# Patient Record
Sex: MALE | Race: WHITE | NOT HISPANIC OR LATINO | Employment: OTHER | URBAN - NONMETROPOLITAN AREA
[De-identification: names, ages, dates, MRNs, and addresses within clinical notes are randomized per-mention and may not be internally consistent; named-entity substitution may affect disease eponyms.]

---

## 2017-04-11 ENCOUNTER — OFFICE VISIT (OUTPATIENT)
Dept: CARDIOLOGY | Facility: CLINIC | Age: 82
End: 2017-04-11

## 2017-04-11 VITALS
SYSTOLIC BLOOD PRESSURE: 112 MMHG | HEART RATE: 64 BPM | WEIGHT: 155 LBS | HEIGHT: 69 IN | DIASTOLIC BLOOD PRESSURE: 48 MMHG | BODY MASS INDEX: 22.96 KG/M2

## 2017-04-11 DIAGNOSIS — R06.00 DYSPNEA, UNSPECIFIED TYPE: ICD-10-CM

## 2017-04-11 DIAGNOSIS — I49.5 SSS (SICK SINUS SYNDROME) (HCC): ICD-10-CM

## 2017-04-11 DIAGNOSIS — I10 ESSENTIAL HYPERTENSION: ICD-10-CM

## 2017-04-11 DIAGNOSIS — I34.0 NON-RHEUMATIC MITRAL REGURGITATION: Primary | ICD-10-CM

## 2017-04-11 DIAGNOSIS — N18.30 CHRONIC RENAL FAILURE, STAGE 3 (MODERATE) (HCC): ICD-10-CM

## 2017-04-11 DIAGNOSIS — I48.0 PAROXYSMAL A-FIB (HCC): ICD-10-CM

## 2017-04-11 PROCEDURE — 99213 OFFICE O/P EST LOW 20 MIN: CPT | Performed by: INTERNAL MEDICINE

## 2017-04-11 NOTE — PROGRESS NOTES
Jaime Velez  89 y.o. male    04/11/2017  1. Non-rheumatic mitral regurgitation    2. SSS (sick sinus syndrome)    3. Paroxysmal a-fib    4. Essential hypertension    5. Dyspnea, unspecified type    6. Chronic renal failure, stage 3 (moderate)        History of Present Illness:  routine follow-up    89 years old patient with history of sick sinus syndrome paroxysmal atrial fibrillation status post pacemaker implantation and hypertension presented for routine follow-up.  He seemed to be older than his stated age in very good mental status still physically very active.  The last echocardiographic study reported normal left and a systolic function with the mild-to-moderate tricuspid regurgitations.  Clinically, no significant progression in that.            SUBJECTIVE    No Known Allergies      Past Medical History:   Diagnosis Date   • Bradycardia    • Chronic renal failure, stage 3 (moderate)    • Depression    • Hypertension    • Hypothyroidism    • Paroxysmal atrial fibrillation    • Pneumonia          Past Surgical History:   Procedure Laterality Date   • APPENDECTOMY     • BACK SURGERY     • CATARACT EXTRACTION, BILATERAL     • INSERT / REPLACE / REMOVE PACEMAKER     • PACEMAKER IMPLANTATION     • PROSTATE SURGERY     • PROSTATE SURGERY           Family History   Problem Relation Age of Onset   • No Known Problems Mother    • No Known Problems Father          Social History     Social History   • Marital status:      Spouse name: N/A   • Number of children: N/A   • Years of education: N/A     Occupational History   • Not on file.     Social History Main Topics   • Smoking status: Former Smoker   • Smokeless tobacco: Never Used   • Alcohol use No   • Drug use: No   • Sexual activity: Defer     Other Topics Concern   • Not on file     Social History Narrative         Current Outpatient Prescriptions   Medication Sig Dispense Refill   • apixaban (ELIQUIS) 2.5 MG tablet tablet Take 2.5 mg by mouth 2 (Two)  "Times a Day.     • Calcium Carb-Cholecalciferol (OYSTER SHELL CALCIUM/VITAMIN D) 250-125 MG-UNIT tablet tablet Take  by mouth 2 (Two) Times a Day.     • diltiazem CD (CARDIZEM CD) 180 MG 24 hr capsule Take 180 mg by mouth Daily.     • metoprolol tartrate (LOPRESSOR) 25 MG tablet Take 12.5 mg by mouth 2 (Two) Times a Day.     • Multiple Vitamins-Minerals (MULTIVITAMIN PO) Take  by mouth.     • sertraline (ZOLOFT) 50 MG tablet Take 50 mg by mouth Daily.       No current facility-administered medications for this visit.          OBJECTIVE    /48  Pulse 64  Ht 69\" (175.3 cm)  Wt 155 lb (70.3 kg)  BMI 22.89 kg/m2        Review of Systems     Constitutional:  Denies recent weight loss, weight gain, fever or chills, no change in exercise tolerance     HENT:  Denies any hearing loss, epistaxis, hoarseness, or difficulty speaking.     Eyes: Wears eyeglasses or contact lenses     Respiratory:  Denies dyspnea with exertion,no cough, wheezing, or hemoptysis.     Cardiovascular: Negative for palpations, chest pain, orthopnea, PND, peripheral edema, syncope, or claudication.     Gastrointestinal:  Denies change in bowel habits, dyspepsia, ulcer disease, hematochezia, or melena.     Endocrine: Negative for cold intolerance, heat intolerance, polydipsia, polyphagia and polyuria. Denies any history of weight change, heat/cold intolerance, polydipsia, polyuria     Genitourinary: Negative.      Musculoskeletal: Denies any history of arthritic symptoms or back problems     Skin:  Denies any change in hair or nails, rashes, or skin lesions.     Allergic/Immunologic: Negative.  Negative for environmental allergies, food allergies and immunocompromised state.     Neurological:  Denies any history of recurrent headaches, strokes, TIA, or seizure disorder.     Hematological: Denies any food allergies, seasonal allergies, bleeding disorders, or lymphadenopathy.     Psychiatric/Behavioral: Denies any history of depression, substance " abuse, or change in cognitive function.         Physical Exam     Constitutional: Cooperative, alert and oriented, well-developed, well-nourished, in no acute distress.     HENT:   Head: Normocephalic, normal hair patterns, no masses or tenderness.  Ears, Nose, and Throat: No gross abnormalities. No pallor or cyanosis. Dentition good.   Eyes: EOMS intact, PERRL, conjunctivae and lids unremarkable. Fundoscopic exam and visual fields not performed.   Neck: No palpable masses or adenopathy, no thyromegaly, no JVD, carotid pulses are full and equal bilaterally and without  Bruits.     Cardiovascular: Regular rhythm, S1 and S2 normal, no S3 or S4. Apical impulse not displaced. No murmurs, gallops, or rubs detected.     Pulmonary/Chest: Chest: normal symmetry, no tenderness to palpation, normal respiratory excursion, no intercostal retraction, no use of accessory muscles.            Pulmonary: Normal breath sounds. No rales or ronchi.    Abdominal: Abdomen soft, bowel sounds normoactive, no masses, no hepatosplenomegaly, non-tender, no bruits.     Musculoskeletal: No deformities, clubbing, cyanosis, erythema, or edema observed. There are no spinal abnormalities noted. Normal muscle strength and tone. Pulses full and equal in all extremities, no bruits auscultated.     Neurological: No gross motor or sensory deficits noted, affect appropriate, oriented to time, person, place.     Skin: Warm and dry to the touch, no apparent skin lesions or masses noted.     Psychiatric: He has a normal mood and affect. His behavior is normal. Judgment and thought content normal.         Procedures      Lab Results   Component Value Date    WBC 8.8 03/05/2014    HGB 11.1 (L) 03/05/2014    HCT 32.8 (L) 03/05/2014    MCV 84.3 03/05/2014     03/05/2014     Lab Results   Component Value Date    GLUCOSE 131 (H) 03/07/2014    BUN 41 (H) 03/07/2014    CREATININE 1.5 (H) 03/07/2014    CO2 26 03/07/2014    CALCIUM 8.3 (L) 03/07/2014     ALBUMIN 3.0 (L) 03/01/2014    AST 19 03/01/2014    ALT 28 03/01/2014     No results found for: CHOL  No results found for: TRIG  No results found for: HDL  No results found for: LDLCALC  No results found for: LDL  No results found for: HDLLDLRATIO  No components found for: CHOLHDL  No results found for: HGBA1C  No results found for: TSH, N2KXZSE, P8APLRJ, THYROIDAB        ASSESSMENT AND PLAN  #1 paroxysmal atrial fibrillations #2 sick sinus syndrome status post pacemaker implantation #3 hypertension with hypertensive heart disease #4 mild-to-moderate tricuspid regurgitation      Clinically there is no sign of any acute cardiac decompensation based on the clinical history physical finding.  No evidence of active ischemia.  The patient is pleased with the clinical outcome.  He is doing remarkably well from cardiac point of view.  His younger than his stated age still physically very active and very good mental status.  The patient will continue apixaban 2.5 mg to decrease risk of cardiac embolic phenomena and Cardizem with history of paroxysmal atrial fibrillation along with metoprolol.  I will see him back in 6-8 month R depends on patient clinical condition.  He will continue followed up with the pacemaker clinic as per scheduled appointment    Diagnoses and all orders for this visit:    Non-rheumatic mitral regurgitation    SSS (sick sinus syndrome)    Paroxysmal a-fib    Essential hypertension    Dyspnea, unspecified type    Chronic renal failure, stage 3 (moderate)        Marcos Barba MD  4/11/2017  2:36 PM

## 2017-05-17 ENCOUNTER — CLINICAL SUPPORT (OUTPATIENT)
Dept: CARDIOLOGY | Facility: CLINIC | Age: 82
End: 2017-05-17

## 2017-05-17 DIAGNOSIS — I49.5 SSS (SICK SINUS SYNDROME) (HCC): Primary | ICD-10-CM

## 2017-05-17 PROCEDURE — 93288 INTERROG EVL PM/LDLS PM IP: CPT | Performed by: INTERNAL MEDICINE

## 2017-11-08 ENCOUNTER — CLINICAL SUPPORT (OUTPATIENT)
Dept: CARDIOLOGY | Facility: CLINIC | Age: 82
End: 2017-11-08

## 2017-11-08 DIAGNOSIS — I49.5 SSS (SICK SINUS SYNDROME) (HCC): Primary | ICD-10-CM

## 2017-11-08 DIAGNOSIS — I48.0 PAROXYSMAL A-FIB (HCC): ICD-10-CM

## 2017-11-08 PROCEDURE — 93288 INTERROG EVL PM/LDLS PM IP: CPT | Performed by: INTERNAL MEDICINE

## 2017-11-08 NOTE — PROGRESS NOTES
89 years old with history of sick sinus syndrome status post pacemaker implantation manufacture [employer].  Model #2210 and serial number [id number].  Implantation date 1/20/2011 and interrogation 11/8/2017.  Battery voltage is good for 4-5 year.  There is a 2% mode switching.  Pacing the atrium about 90% ventricle less than 10% of time.  Pacing programmed DDD at 60 and 120.  AV delay 250 and PV delay to 25.  Patient currently in atrial fibrillation with right atrial lead impedance 390.  Sensing R-wave greater than 10 with a lead impedance 480 and threshold 1 at 0.5.  Clinical impression normal function pacemaker recommend to follow up in 6 month

## 2017-11-21 ENCOUNTER — HOSPITAL ENCOUNTER (OUTPATIENT)
Dept: SPEECH THERAPY | Facility: HOSPITAL | Age: 82
Setting detail: THERAPIES SERIES
Discharge: HOME | End: 2017-11-21

## 2017-11-21 ENCOUNTER — TRANSCRIBE ORDERS (OUTPATIENT)
Dept: SPEECH THERAPY | Facility: HOSPITAL | Age: 82
End: 2017-11-21

## 2017-11-21 DIAGNOSIS — J69.0 ASPIRATION PNEUMONIA OF LEFT LUNG, UNSPECIFIED ASPIRATION PNEUMONIA TYPE, UNSPECIFIED PART OF LUNG (HCC): Primary | ICD-10-CM

## 2017-11-21 PROCEDURE — G8998 SWALLOW D/C STATUS: HCPCS | Performed by: SPEECH-LANGUAGE PATHOLOGIST

## 2017-11-21 PROCEDURE — G8997 SWALLOW GOAL STATUS: HCPCS | Performed by: SPEECH-LANGUAGE PATHOLOGIST

## 2017-11-21 PROCEDURE — G8996 SWALLOW CURRENT STATUS: HCPCS | Performed by: SPEECH-LANGUAGE PATHOLOGIST

## 2017-11-21 PROCEDURE — 92610 EVALUATE SWALLOWING FUNCTION: CPT | Performed by: SPEECH-LANGUAGE PATHOLOGIST

## 2017-11-21 NOTE — THERAPY DISCHARGE NOTE
Outpatient Speech Language Pathology   Adult Swallow Initial Eval/Discharge  [facility]     Patient Name: Jaime Velez  : 3/22/1926  MRN: [id number]  Today's Date: 2017      Patient presented to outpatient speech following referral from Dr. Jp Ackerman. Patient is being treated for pneumonia. Patient stated that he does cough following meals at times but does not have difficulties eating. Patient stated that his cough has cleared since taking antibiotics for pneumonia. Patient completed bedside swallow evaluation this date. Patient completed trials of hard solid, soft solid, and thin liquids. Trials were self-fed. Thin liquid via rim of cup. Patient presented with appropriate oral phase and indicated no s/s of pharyngeal phase swallowing difficulties. Patient completed half a sandwich and 4 oz of thin liquids with no s/s of aspiration. Patient was able to produce a volitional cough and swallow. Hyolaryngeal elevation was judged to be appropriate. Patient was educated on swallow evaluation and given a brochure regarding normal swallow function and s/s of aspiration. Patient and daughter were in agreement. Patient does not present with dysphagia this date. Swallowing appears to be WFL. SLP encouraged patient to call if any changes occur. Evaluation only, discharge this date.     Visit Date: 2017   Patient Active Problem List   Diagnosis   • Chronic renal failure, stage 3 (moderate)   • Pneumonia   • Dyspnea   • Essential hypertension   • Paroxysmal a-fib   • SSS (sick sinus syndrome)   • Non-rheumatic mitral regurgitation        Past Medical History:   Diagnosis Date   • Bradycardia    • Chronic renal failure, stage 3 (moderate)    • Depression    • Hypertension    • Hypothyroidism    • Paroxysmal atrial fibrillation    • Pneumonia         Past Surgical History:   Procedure Laterality Date   • APPENDECTOMY     • BACK SURGERY     • CATARACT EXTRACTION, BILATERAL     • INSERT / REPLACE / REMOVE  PACEMAKER     • PACEMAKER IMPLANTATION     • PROSTATE SURGERY     • PROSTATE SURGERY           Visit Dx:     ICD-10-CM ICD-9-CM   1. Other dysphagia R13.19 787.29                   Adult Dysphagia - 11/21/17 0906     Adult Dysphagia Background    Patient Description of Complaint Patient stated that he coughs following meals at times but cough has cleared since being treated for pneumonia   -EA    Date of Onset Oct. 2017  -EA    Symptoms Reported by Patient Coughing  -EA    Patient Report of Functional Impact Patient stated that his difficulties have not impacted his eating - pt eats a regular diet  -EA    Current Diet (Solids) Regular  -EA    Diet Level (Liquids) Thin Liquid  -EA    Oral Mech    Respiratory/Swallow Coordination Pattern WFL  -EA    Respiratory/Swallow Coordination Pattern Details [id number]  -EA    Position During Evaluation Upright  -EA    Anatomy/Physiology WNL Patient demonstrates anatomy that is WNL  -EA    Dentition Patient is partially edentulous;Patient has dentures;Dentures were worn for this evaluation  -EA    Oral Health Oral cavity is clean  -EA    Awareness/Control of Secretions Patient swallows saliva  -EA    Foods/Liquids Presented    Method of Administration Self-fed  -EA    Self-Fed    Consistency Thin;Soft Solid;Hard Solid  -EA    Amount Half of a sandwich; thin liquids via cup  -EA    Response No Signs/Symptoms  -EA    Strategies Attempted None  -EA    Oral Phase Impaired Physiology Observed    Oral Phase No impairments noted  -EA    Pharyngeal Symptoms None observed  -EA    Summary Comments of Clinical Exam Patient presents with swallowing WFL. Patient presents with no s/s of dysphagia.  -EA      User Key  (r) = Recorded By, (t) = Taken By, (c) = Cosigned By    Initials Name Provider Type    GREGORIO Huang MS CCC-SLP Speech and Language Pathologist                            OP SLP Education       11/21/17 1000    Education    Barriers to Learning No barriers  identified  -EA    Action Taken to Address Barriers Daughter was present for evaluation   -EA    Education Provided Described results of evaluation;Patient expressed understanding of evaluation;Family/caregivers expressed understanding of evaluation  -EA    Assessed Learning needs;Learning readiness  -EA    Learning Motivation Strong  -EA    Learning Method Explanation;Demonstration;Written materials  -EA    Teaching Response Verbalized understanding;Demonstrated understanding  -EA    Education Comments SLP reviewed the results of the evaluation and provided brochure with information regarding swallowing stages, safe swallow strategies, and s/s of aspiration. SLP educated patient on normal swallow and s/s to be aware regarding aspiration. Patient was in agreement and verbalized understanding.   -EA      User Key  (r) = Recorded By, (t) = Taken By, (c) = Cosigned By    Initials Name Effective Dates    GREGORIO Huang MS CCC-SLP 10/17/16 -                     OP SLP Assessment/Plan - 11/21/17 1000     SLP Assessment    Patient would benefit from skilled therapy intervention No  -EA    SLP Plan    Plan Comments Evaluation Only - D/C  -EA      User Key  (r) = Recorded By, (t) = Taken By, (c) = Cosigned By    Initials Name Provider Type    GREGORIO Huang MS CCC-SLP Speech and Language Pathologist                 Time Calculation:   SLP Start Time: 0906  SLP Stop Time: 0953  SLP Time Calculation (min): 47 min  Total Timed Code Minutes- SLP: 47 minute(s)    Therapy Charges for Today     Code Description Service Date Service Provider Modifiers Qty    09166847822 HC ST SWALLOWING CURRENT STATUS 11/21/2017 Luh Huang MS CCC-SLP GN, CI 1    03970327676 HC ST SWALLOWING PROJECTED 11/21/2017 Luh Huang MS CCC-SLP GN, CI 1    1934450 HC ST SWALLOWING DISCHARGE 11/21/2017 Luh Huang MS CCC-SLP GN, CI 1    [id number] HC ST EVAL ORAL PHARYNG SWALLOW 3 11/21/2017 Luh Huang MS  CCC-SLP GN 1          SLP G-Codes  Functional Limitations: Swallowing  Swallow Current Status (): At least 1 percent but less than 20 percent impaired, limited or restricted  Swallow Goal Status (): At least 1 percent but less than 20 percent impaired, limited or restricted  Swallow Discharge Status (): At least 1 percent but less than 20 percent impaired, limited or restricted    OP SLP Discharge Summary  Date of Discharge: 11/21/17  Reason for Discharge: other (comment)  Outcomes Achieved: Other  Discharge Destination: Other (comment)  Discharge Instructions: Evaluation Only - SLP provided brochure and education on normal swallow and s/s of aspiration       Luh Huang MS CCC-SLP  11/21/2017

## 2017-12-05 ENCOUNTER — OFFICE VISIT (OUTPATIENT)
Dept: CARDIOLOGY | Facility: CLINIC | Age: 82
End: 2017-12-05

## 2017-12-05 VITALS
BODY MASS INDEX: 23.25 KG/M2 | SYSTOLIC BLOOD PRESSURE: 124 MMHG | DIASTOLIC BLOOD PRESSURE: 66 MMHG | HEIGHT: 69 IN | HEART RATE: 64 BPM | WEIGHT: 157 LBS

## 2017-12-05 DIAGNOSIS — I49.5 SSS (SICK SINUS SYNDROME) (HCC): Primary | ICD-10-CM

## 2017-12-05 DIAGNOSIS — I10 ESSENTIAL HYPERTENSION: ICD-10-CM

## 2017-12-05 DIAGNOSIS — I48.0 PAROXYSMAL A-FIB (HCC): ICD-10-CM

## 2017-12-05 DIAGNOSIS — I34.0 NON-RHEUMATIC MITRAL REGURGITATION: ICD-10-CM

## 2017-12-05 PROCEDURE — 99213 OFFICE O/P EST LOW 20 MIN: CPT | Performed by: INTERNAL MEDICINE

## 2017-12-05 NOTE — PROGRESS NOTES
Jaime Velez  89 y.o. male    12/05/2017  1. SSS (sick sinus syndrome)    2. Non-rheumatic mitral regurgitation    3. Paroxysmal a-fib    4. Essential hypertension        History of Present Illness:  routine follow-up     89 years old patient with history of sick sinus syndrome paroxysmal atrial fibrillation status post pacemaker implantation and hypertension presented for routine follow-up.  He seemed to be younger than his stated age , very good mental status still physically very active and good sense of humor.  The last echocardiographic study reported normal left and a systolic function with the mild-to-moderate tricuspid regurgitations.      Model #2210 and serial number [id number].  Implantation date 1/20/2011 and interrogation 11/8/2017.  Battery voltage is good for 4-5 year.  There is a 2% mode switching.  Pacing the atrium about 90% ventricle less than 10% of time.  Pacing programmed DDD at 60 and 120.  AV delay 250 and PV delay to 25.  Patient currently in atrial fibrillation with right atrial lead impedance 390.  Sensing R-wave greater than 10 with a lead impedance 480 and threshold 1 at 0.5.  Clinical impression normal function pacemaker recommend to follow up in 6 month      SUBJECTIVE    No Known Allergies      Past Medical History:   Diagnosis Date   • Bradycardia    • Chronic renal failure, stage 3 (moderate)    • Depression    • Hypertension    • Hypothyroidism    • Paroxysmal atrial fibrillation    • Pneumonia          Past Surgical History:   Procedure Laterality Date   • APPENDECTOMY     • BACK SURGERY     • CATARACT EXTRACTION, BILATERAL     • INSERT / REPLACE / REMOVE PACEMAKER     • PACEMAKER IMPLANTATION     • PROSTATE SURGERY     • PROSTATE SURGERY           Family History   Problem Relation Age of Onset   • No Known Problems Mother    • No Known Problems Father          Social History     Social History   • Marital status:      Spouse name: N/A   • Number of children: N/A   •  "Years of education: N/A     Occupational History   • Not on file.     Social History Main Topics   • Smoking status: Former Smoker   • Smokeless tobacco: Never Used   • Alcohol use No   • Drug use: No   • Sexual activity: Defer     Other Topics Concern   • Not on file     Social History Narrative         Current Outpatient Prescriptions   Medication Sig Dispense Refill   • apixaban (ELIQUIS) 2.5 MG tablet tablet Take 2.5 mg by mouth 2 (Two) Times a Day.     • Calcium Carb-Cholecalciferol (OYSTER SHELL CALCIUM/VITAMIN D) 250-125 MG-UNIT tablet tablet Take  by mouth 2 (Two) Times a Day.     • diltiazem CD (CARDIZEM CD) 180 MG 24 hr capsule Take 180 mg by mouth Daily.     • metoprolol tartrate (LOPRESSOR) 25 MG tablet Take 12.5 mg by mouth 2 (Two) Times a Day.     • Multiple Vitamins-Minerals (MULTIVITAMIN PO) Take  by mouth.     • sertraline (ZOLOFT) 50 MG tablet Take 50 mg by mouth Daily.       No current facility-administered medications for this visit.          OBJECTIVE    /66  Pulse 64  Ht 175.3 cm (69\")  Wt 71.2 kg (157 lb)  BMI 23.18 kg/m2        Review of Systems     Constitutional:  Denies recent weight loss, weight gain, fever or chills, no change in exercise tolerance     HENT:  Denies any hearing loss, epistaxis, hoarseness, or difficulty speaking.     Eyes: Wears eyeglasses or contact lenses     Respiratory:  Denies dyspnea with exertion,no cough, wheezing, or hemoptysis.     Cardiovascular: Negative for palpations, chest pain, orthopnea, PND, peripheral edema, syncope, or claudication.     Gastrointestinal:  Denies change in bowel habits, dyspepsia, ulcer disease, hematochezia, or melena.     Endocrine: Negative for cold intolerance, heat intolerance, polydipsia, polyphagia and polyuria. Denies any history of weight change, heat/cold intolerance, polydipsia, polyuria     Genitourinary: Negative.      Musculoskeletal: Denies any history of arthritic symptoms or back problems     Skin:  Denies " any change in hair or nails, rashes, or skin lesions.     Allergic/Immunologic: Negative.  Negative for environmental allergies, food allergies and immunocompromised state.     Neurological:  Denies any history of recurrent headaches, strokes, TIA, or seizure disorder.     Hematological: Denies any food allergies, seasonal allergies, bleeding disorders, or lymphadenopathy.     Psychiatric/Behavioral: Denies any history of depression, substance abuse, or change in cognitive function.         Physical Exam     Constitutional: Cooperative, alert and oriented, well-developed, well-nourished, in no acute distress.     HENT:   Head: Normocephalic, normal hair patterns, no masses or tenderness.  Ears, Nose, and Throat: No gross abnormalities. No pallor or cyanosis. Dentition good.   Eyes: EOMS intact, PERRL, conjunctivae and lids unremarkable. Fundoscopic exam and visual fields not performed.   Neck: No palpable masses or adenopathy, no thyromegaly, no JVD, carotid pulses are full and equal bilaterally and without  Bruits.     Cardiovascular: Regular rhythm, S1 and S2 normal, no S3 or S4. Apical impulse not displaced. No murmurs, gallops, or rubs detected.     Pulmonary/Chest: Chest: normal symmetry, no tenderness to palpation, normal respiratory excursion, no intercostal retraction, no use of accessory muscles.            Pulmonary: Normal breath sounds. No rales or ronchi.    Abdominal: Abdomen soft, bowel sounds normoactive, no masses, no hepatosplenomegaly, non-tender, no bruits.     Musculoskeletal: No deformities, clubbing, cyanosis, erythema, or edema observed. There are no spinal abnormalities noted. Normal muscle strength and tone. Pulses full and equal in all extremities, no bruits auscultated.     Neurological: No gross motor or sensory deficits noted, affect appropriate, oriented to time, person, place.     Skin: Warm and dry to the touch, no apparent skin lesions or masses noted.     Psychiatric: He has a  normal mood and affect. His behavior is normal. Judgment and thought content normal.         Procedures      Lab Results   Component Value Date    WBC 8.8 03/05/2014    HGB 11.1 (L) 03/05/2014    HCT 32.8 (L) 03/05/2014    MCV 84.3 03/05/2014     03/05/2014     Lab Results   Component Value Date    GLUCOSE 131 (H) 03/07/2014    BUN 41 (H) 03/07/2014    CREATININE 1.5 (H) 03/07/2014    CO2 26 03/07/2014    CALCIUM 8.3 (L) 03/07/2014    ALBUMIN 3.0 (L) 03/01/2014    AST 19 03/01/2014    ALT 28 03/01/2014     No results found for: CHOL  No results found for: TRIG  No results found for: HDL  No results found for: LDLCALC  No results found for: LDL  No results found for: HDLLDLRATIO  No components found for: CHOLHDL  No results found for: HGBA1C  No results found for: TSH, E1MODYG, N1OPZOW, THYROIDAB        ASSESSMENT AND PLAN    #1 paroxysmal atrial fibrillations #2 sick sinus syndrome status post pacemaker implantation #3 hypertension with hypertensive heart disease #4 mild-to-moderate tricuspid regurgitation        Clinically there is no sign of any acute cardiac decompensation based on the clinical history physical finding.  No evidence of active ischemia.  The patient is pleased with the clinical outcome.  He is doing remarkably well from cardiac point of view.  His younger than his stated age still physically very active and very good mental status.  The patient will continue apixaban 2.5 mg to decrease risk of cardiac embolic phenomena and Cardizem with history of paroxysmal atrial fibrillation along with metoprolol.  I will see him back in 6 OR depends on patient clinical condition.   Jaime was seen today for follow-up.    Diagnoses and all orders for this visit:    SSS (sick sinus syndrome)    Non-rheumatic mitral regurgitation    Paroxysmal a-fib    Essential hypertension        Marcos Barba MD  12/5/2017  2:34 PM

## 2018-05-09 ENCOUNTER — CLINICAL SUPPORT (OUTPATIENT)
Dept: CARDIOLOGY | Facility: CLINIC | Age: 83
End: 2018-05-09

## 2018-05-09 DIAGNOSIS — I49.5 SSS (SICK SINUS SYNDROME) (HCC): Primary | ICD-10-CM

## 2018-05-09 DIAGNOSIS — Z95.0 PACEMAKER: ICD-10-CM

## 2018-05-09 DIAGNOSIS — I48.0 PAROXYSMAL A-FIB (HCC): ICD-10-CM

## 2018-05-09 PROCEDURE — 93288 INTERROG EVL PM/LDLS PM IP: CPT | Performed by: NURSE PRACTITIONER

## 2018-05-09 NOTE — PROGRESS NOTES
Pacemaker Evaluation Report    May 9, 2018    Primary Cardiologist: Dr. Barba  Implanting MD: Dr. Barba  :[employer] Model: [employer] RF 2210 Serial Number: [id number]  Implant date: 1/20/2011    Reason for evaluation:routine   Office   PPM  Cardiac device indication(s): sick sinus syndrome    Battery  EMETERIO: 4.2 years     2.86v   Last charge: 0    Interrogation Results  Atrial sensing: P wave: 0.5 mV  Atrial capture: 0 V @ 0 ms Atrial Fib  Atrial lead impedance: 440 ohms  Ventricular sensing: R wave: 8.0 mV  Ventricular capture: 0.87 V @ 0.5 ms  LV: 0 V @ 0 ms   Ventricular lead impedance: right  460 ohms; left  0 ohms    Parameters  Mode: DDDR  Base Rate: 60/120    Diagnostic Data  Atrial paced: 1.4 % Ventricular paced: 63 %  Mode switch: >99%  AT/AF [place]: 8.9%  AHR: 397  Longest 6:49 min  (ongoing)  VHR: 2  AFib with RVR    Changes made: No changes made    Conclusions: normal device function    Assessment:  Presence of PPM  SSS          This document has been electronically signed by LYNDA Ponce on May 9, 2018 3:54 PM

## 2018-11-13 NOTE — PROGRESS NOTES
Pacemaker Evaluation Report    November 14, 2018    Primary Cardiologist: Dr. Barba  Implanting MD: Dr. Barba  :Abbott Model: Accent DR RF 2210 Serial Number: [id number]  Implant date: 1/20/2011     Reason for evaluation:routine   Office   PPM  Cardiac device indication(s): sick sinus syndrome    Battery  EMETERIO: 3.4 years     Interrogation Results  Atrial sensing: P wave: 0.5 mV  Atrial capture: not performed  Atrial lead impedance: 460 ohms  Ventricular sensing: R wave: 10.6 mV  Ventricular capture: 0.5 V @ 0.5 ms  Ventricular lead impedance: right  530 ohms    Parameters  Mode: DDDR  Base Rate: 60/120    Diagnostic Data  Atrial paced: <1 %   Ventricular paced: 60 %  Mode switch: 184, >99%  AT/AF [place]: >99%  AHR: 183  VHR: 0    Changes made: Turned of RV Auto Cap for high output    Conclusions: normal device function and Follow up in 6 months    Assessment:  1. SSS (sick sinus syndrome) (CMS/HCC)    2. Pacemaker              This document has been electronically signed by LYNDA Ponce on November 14, 2018 2:25 PM

## 2018-11-14 ENCOUNTER — CLINICAL SUPPORT (OUTPATIENT)
Dept: CARDIOLOGY | Facility: CLINIC | Age: 83
End: 2018-11-14

## 2018-11-14 DIAGNOSIS — Z95.0 PACEMAKER: ICD-10-CM

## 2018-11-14 DIAGNOSIS — I49.5 SSS (SICK SINUS SYNDROME) (HCC): Primary | ICD-10-CM

## 2018-11-14 PROCEDURE — 93280 PM DEVICE PROGR EVAL DUAL: CPT | Performed by: NURSE PRACTITIONER

## 2018-12-04 ENCOUNTER — OFFICE VISIT (OUTPATIENT)
Dept: CARDIOLOGY | Facility: CLINIC | Age: 83
End: 2018-12-04

## 2018-12-04 VITALS
DIASTOLIC BLOOD PRESSURE: 64 MMHG | BODY MASS INDEX: 22.81 KG/M2 | HEIGHT: 69 IN | WEIGHT: 154 LBS | OXYGEN SATURATION: 98 % | SYSTOLIC BLOOD PRESSURE: 122 MMHG | HEART RATE: 66 BPM

## 2018-12-04 DIAGNOSIS — N18.30 CHRONIC RENAL FAILURE, STAGE 3 (MODERATE) (HCC): ICD-10-CM

## 2018-12-04 DIAGNOSIS — I49.5 SSS (SICK SINUS SYNDROME) (HCC): ICD-10-CM

## 2018-12-04 DIAGNOSIS — I34.0 NON-RHEUMATIC MITRAL REGURGITATION: ICD-10-CM

## 2018-12-04 DIAGNOSIS — I10 ESSENTIAL HYPERTENSION: Primary | ICD-10-CM

## 2018-12-04 DIAGNOSIS — I48.0 PAROXYSMAL A-FIB (HCC): ICD-10-CM

## 2018-12-04 PROCEDURE — 99213 OFFICE O/P EST LOW 20 MIN: CPT | Performed by: INTERNAL MEDICINE

## 2018-12-04 NOTE — PROGRESS NOTES
Jaime Velez  89 y.o. male    12/04/2018  1. Essential hypertension    2. Paroxysmal A-fib (CMS/HCC)    3. SSS (sick sinus syndrome) (CMS/HCC)    4. Non-rheumatic mitral regurgitation    5. Chronic renal failure, stage 3 (moderate) (CMS/HCC)        History of Present Illness:    89 years old patient with history of sick sinus syndrome paroxysmal atrial fibrillation status post pacemaker implantation and hypertension presented for routine follow-up.  He seemed to be younger than his stated age , very good mental status still physically very active and good sense of humor.  Previous echocardiographic study 2016 reported normal left  systolic function with the mild-to-moderate tricuspid regurgitations.          Battery 11/2018  EMETERIO: 3.4 years      Interrogation Results  Atrial sensing: P wave: 0.5 mV  Atrial capture: not performed  Atrial lead impedance: 460 ohms  Ventricular sensing: R wave: 10.6 mV  Ventricular capture: 0.5 V @ 0.5 ms  Ventricular lead impedance: right  530 ohms     Parameters  Mode: DDDR  Base Rate: 60/120     Diagnostic Data  Atrial paced: <1 %   Ventricular paced: 60 %  Mode switch: 184, >99%  AT/AF [place]: >99%  AHR: 183  VHR: 0     Changes made: Turned of RV Auto Cap for high output     Conclusions: normal device function and Follow up in 6 months     Assessment:  1. SSS (sick sinus syndrome) (CMS/HCC)    2. Pacemaker             SUBJECTIVE:    No Known Allergies      Past Medical History:   Diagnosis Date   • Bradycardia    • Chronic renal failure, stage 3 (moderate) (CMS/HCC)    • Depression    • Hypertension    • Hypothyroidism    • Paroxysmal atrial fibrillation (CMS/HCC)    • Pneumonia          Past Surgical History:   Procedure Laterality Date   • APPENDECTOMY     • BACK SURGERY     • CATARACT EXTRACTION, BILATERAL     • INSERT / REPLACE / REMOVE PACEMAKER     • PACEMAKER IMPLANTATION     • PROSTATE SURGERY     • PROSTATE SURGERY           Family History   Problem Relation Age of Onset    • No Known Problems Mother    • No Known Problems Father          Social History     Socioeconomic History   • Marital status:      Spouse name: Not on file   • Number of children: Not on file   • Years of education: Not on file   • Highest education level: Not on file   Social Needs   • Financial resource strain: Not on file   • Food insecurity - worry: Not on file   • Food insecurity - inability: Not on file   • Transportation needs - medical: Not on file   • Transportation needs - non-medical: Not on file   Occupational History   • Not on file   Tobacco Use   • Smoking status: Former Smoker   • Smokeless tobacco: Never Used   Substance and Sexual Activity   • Alcohol use: No   • Drug use: No   • Sexual activity: Defer   Other Topics Concern   • Not on file   Social History Narrative   • Not on file         Current Outpatient Medications   Medication Sig Dispense Refill   • apixaban (ELIQUIS) 2.5 MG tablet tablet Take 2.5 mg by mouth 2 (Two) Times a Day.     • Calcium Carb-Cholecalciferol (OYSTER SHELL CALCIUM/VITAMIN D) 250-125 MG-UNIT tablet tablet Take  by mouth 2 (Two) Times a Day.     • diltiazem CD (CARDIZEM CD) 180 MG 24 hr capsule Take 180 mg by mouth Daily.     • metoprolol tartrate (LOPRESSOR) 25 MG tablet Take 12.5 mg by mouth 2 (Two) Times a Day.     • Multiple Vitamins-Minerals (MULTIVITAMIN PO) Take  by mouth.     • sertraline (ZOLOFT) 50 MG tablet Take 50 mg by mouth Daily.       No current facility-administered medications for this visit.            Review of Systems:     Constitutional:  Denies recent weight loss, weight gain, fever or chills, no change in exercise tolerance.     HENT:  Denies any hearing loss, epistaxis, hoarseness, or difficulty speaking.     Eyes: Wears eyeglasses or contact lenses.    Respiratory:  Denies dyspnea with exertion,no cough, wheezing, or hemoptysis.     Cardiovascular: Negative for palpations, chest pain, orthopnea, PND, peripheral edema, syncope, or  "claudication.     Gastrointestinal:  Denies change in bowel habits, dyspepsia, ulcer disease, hematochezia, or melena.     Endocrine: Negative for cold intolerance, heat intolerance, polydipsia, polyphagia and polyuria. Denies any history of weight change, polydipsia, polyuria.     Genitourinary: Negative.      Musculoskeletal: Denies any history of arthritic symptoms or back problems.     Skin:  Denies any change in hair or nails, rashes, or skin lesions.     Allergic/Immunologic: Negative.  Negative for environmental allergies, food allergies and immunocompromised state.     Neurological:  Denies any history of recurrent headaches, strokes, TIA, or seizure disorder.     Hematological: Denies any food allergies, seasonal allergies, bleeding disorders, or lymphadenopathy.     Psychiatric/Behavioral: Denies any history of depression, substance abuse, or change in cognitive function.       OBJECTIVE:    /64   Pulse 66   Ht 175.3 cm (69\")   Wt 69.9 kg (154 lb)   SpO2 98%   BMI 22.74 kg/m²       Physical Exam:     Constitutional: Cooperative, alert and oriented, well-developed, well-nourished, in no acute distress.     HENT:   Head: Normocephalic, normal hair patterns, no masses or tenderness.  Ears, Nose, and Throat: No gross abnormalities. No pallor or cyanosis. Dentition good.   Eyes: EOMS intact, PERRL, conjunctivae and lids unremarkable. Fundoscopic exam and visual fields not performed.   Neck: No palpable masses or adenopathy, no thyromegaly, no JVD, carotid pulses are full and equal bilaterally and without  Bruits.     Cardiovascular: Regular rhythm, S1 and S2 normal, no S3 or S4. Apical impulse not displaced. No murmurs, gallops, or rubs detected.     Pulmonary/Chest: Chest: normal symmetry, no tenderness to palpation, normal respiratory excursion, no intercostal retraction, no use of accessory muscles. Pulmonary: Normal breath sounds. No rales or rhonchi.    Abdominal: Abdomen soft, bowel sounds " normoactive, no masses, no hepatosplenomegaly, non-tender, no bruits.     Musculoskeletal: No deformities, clubbing, cyanosis, erythema, or edema observed. There are no spinal abnormalities noted. Normal muscle strength and tone. Pulses full and equal in all extremities, no bruits auscultated.     Neurological: No gross motor or sensory deficits noted, affect appropriate, oriented to time, person, place.     Skin: Warm and dry to the touch, no apparent skin lesions or masses noted.     Psychiatric: He has a normal mood and affect. His behavior is normal. Judgment and thought content normal.         Procedures      Lab Results   Component Value Date    WBC 8.8 03/05/2014    HGB 11.1 (L) 03/05/2014    HCT 32.8 (L) 03/05/2014    MCV 84.3 03/05/2014     03/05/2014     Lab Results   Component Value Date    GLUCOSE 131 (H) 03/07/2014    BUN 41 (H) 03/07/2014    CREATININE 1.5 (H) 03/07/2014    CO2 26 03/07/2014    CALCIUM 8.3 (L) 03/07/2014    ALBUMIN 3.0 (L) 03/01/2014    AST 19 03/01/2014    ALT 28 03/01/2014     No results found for: CHOL  No results found for: TRIG  No results found for: HDL  No components found for: LDLCALC  No results found for: LDL  No results found for: HDLLDLRATIO  No components found for: CHOLHDL  No results found for: HGBA1C  No results found for: TSH, D9EHSKC, R4DBTGS, THYROIDAB        ASSESSMENT AND PLAN:  #1 paroxysmal atrial fibrillations #2 sick sinus syndrome status post pacemaker implantation #3 hypertension with hypertensive heart disease #4 mild-to-moderate tricuspid regurgitation        Clinically there is no sign of any acute cardiac decompensation based on the clinical history physical finding.  No evidence of active ischemia.  The patient is pleased with the clinical outcome.  He is doing remarkably well from cardiac point of view.  His younger than his stated age still physically very active and very good mental status.  The patient will continue apixaban 2.5 mg to decrease  risk of cardiac embolic phenomena and Cardizem with history of paroxysmal atrial fibrillation along with metoprolol.  I will see him back in 6 OR depends on patient clinical condition.  we will arrange an echocardiographic study to reassess the tricuspid regurgitations prior to the next visit        Jaime was seen today for follow-up.    Diagnoses and all orders for this visit:    Essential hypertension    Paroxysmal A-fib (CMS/HCC)    SSS (sick sinus syndrome) (CMS/HCC)    Non-rheumatic mitral regurgitation    Chronic renal failure, stage 3 (moderate) (CMS/HCC)        Marcos Barba MD  12/4/2018  1:46 PM

## 2019-05-22 ENCOUNTER — CLINICAL SUPPORT (OUTPATIENT)
Dept: CARDIOLOGY | Facility: CLINIC | Age: 84
End: 2019-05-22

## 2019-05-22 DIAGNOSIS — Z95.0 PACEMAKER: ICD-10-CM

## 2019-05-22 DIAGNOSIS — I49.5 SSS (SICK SINUS SYNDROME) (HCC): Primary | ICD-10-CM

## 2019-05-22 DIAGNOSIS — I48.0 PAROXYSMAL A-FIB (HCC): ICD-10-CM

## 2019-05-22 DIAGNOSIS — Z79.01 CURRENT USE OF LONG TERM ANTICOAGULATION: ICD-10-CM

## 2019-05-22 PROCEDURE — 93288 INTERROG EVL PM/LDLS PM IP: CPT | Performed by: NURSE PRACTITIONER

## 2019-05-22 NOTE — PROGRESS NOTES
Pacemaker Evaluation Report    05/22/2019    Primary Cardiologist: Dr. Barba  Implanting MD: Dr. Barba  :[employer]ott Model: Accent DR RF 2210 Serial Number: [id number]  Implant date: 1/20/2011     Reason for evaluation:routine   Office   PPM  Cardiac device indication(s): sick sinus syndrome    Battery  EMETERIO: 2.2-2.6 years     Interrogation Results  Atrial sensing: P wave: 0.4 mV  Atrial capture: not performed  Atrial lead impedance: 430 ohms  Ventricular sensing: R wave: 8.7 mV  Ventricular capture: 0.75 V @ 0.5 ms  Ventricular lead impedance: right  550 ohms    Parameters  Mode: DDDR  Base Rate: 60/120    Diagnostic Data  Atrial paced: <1 %   Ventricular paced: 69 %  Mode switch:  >99%  AT/AF [place]: >99%  AHR: 56  VHR: 0    Changes made: N/A    Conclusions: normal device function and Follow up in 6 months    Assessment:  1. SSS (sick sinus syndrome) (CMS/HCC)    2. Paroxysmal A-fib (CMS/HCC)    3. Current use of long term anticoagulation    4. Pacemaker          This document has been electronically signed by LYNDA Kim on May 22, 2019 1:52 PM

## 2019-11-13 ENCOUNTER — CLINICAL SUPPORT (OUTPATIENT)
Dept: CARDIOLOGY | Facility: CLINIC | Age: 84
End: 2019-11-13

## 2019-11-13 DIAGNOSIS — I49.5 SSS (SICK SINUS SYNDROME) (HCC): Primary | ICD-10-CM

## 2019-11-13 DIAGNOSIS — Z95.0 PACEMAKER: ICD-10-CM

## 2019-11-13 PROCEDURE — 93288 INTERROG EVL PM/LDLS PM IP: CPT | Performed by: NURSE PRACTITIONER

## 2019-11-13 NOTE — PROGRESS NOTES
Pacemaker Evaluation Report    November 13, 2019    Primary Cardiologist: Dr. Barba  Implanting MD: Dr. Barba  :A[employer]ott Model: Accent DR RF 2210 Serial Number: [id number]  Implant date: 1/20/2011     Reason for evaluation:routine   Office   PPM  Cardiac device indication(s): sick sinus syndrome    Battery  EMETERIO: 1.7-2.1 years     Interrogation Results  Atrial sensing: P wave: 1.0 mV  Atrial capture: A fib  Atrial lead impedance: 430 ohms  Ventricular sensing: R wave: 11.5 mV  Ventricular capture: 0.5 V @ 0.5 ms  Ventricular lead impedance: right  580 ohms    Parameters  Mode: DDDR  Base Rate: 60/120    Diagnostic Data  Atrial paced: <1%   Ventricular paced: 63 %  Mode switch: 100%  AT/AF [place]: 100%  AHR: Chronic A fib  VHR: 1    Intrinsic Rate: 62    Changes made: none    Conclusions: normal device function and Follow up in 6 months    Assessment:  1. SSS (sick sinus syndrome) (CMS/HCC)    2. Pacemaker            This document has been electronically signed by LYNDA Ponce on November 13, 2019 5:20 PM

## 2019-12-03 ENCOUNTER — OFFICE VISIT (OUTPATIENT)
Dept: CARDIOLOGY | Facility: CLINIC | Age: 84
End: 2019-12-03

## 2019-12-03 VITALS
OXYGEN SATURATION: 99 % | WEIGHT: 139 LBS | DIASTOLIC BLOOD PRESSURE: 64 MMHG | BODY MASS INDEX: 20.59 KG/M2 | HEIGHT: 69 IN | HEART RATE: 68 BPM | SYSTOLIC BLOOD PRESSURE: 108 MMHG

## 2019-12-03 DIAGNOSIS — I49.5 SSS (SICK SINUS SYNDROME) (HCC): ICD-10-CM

## 2019-12-03 DIAGNOSIS — I48.0 PAROXYSMAL A-FIB (HCC): ICD-10-CM

## 2019-12-03 DIAGNOSIS — I34.0 NON-RHEUMATIC MITRAL REGURGITATION: ICD-10-CM

## 2019-12-03 DIAGNOSIS — I10 ESSENTIAL HYPERTENSION: Primary | ICD-10-CM

## 2019-12-03 PROCEDURE — 99213 OFFICE O/P EST LOW 20 MIN: CPT | Performed by: INTERNAL MEDICINE

## 2019-12-03 RX ORDER — PREDNISONE 2.5 MG
3 TABLET ORAL DAILY
COMMUNITY

## 2019-12-03 NOTE — PROGRESS NOTES
Jaime Velez  89 y.o. male    12/03/2019  1. Essential hypertension    2. Paroxysmal A-fib (CMS/HCC)    3. SSS (sick sinus syndrome) (CMS/HCC)    4. Non-rheumatic mitral regurgitation        History of Present Illness:    Patient's Body mass index is 20.53 kg/m². BMI is within normal parameters. No follow-up required..    89 years old patient with history of sick sinus syndrome paroxysmal atrial fibrillation status post pacemaker implantation and hypertension presented for routine follow-up.  He seemed to be younger than his stated age , very good mental status still physically very active and good sense of humor.  Previous echocardiographic study 2016 reported normal left  systolic function with the mild-to-moderate tricuspid regurgitations will arrange echocardiographic study to reassess the biventricular function and regurgitation status        9/2019  Glucose  70 - 110 mg/dL 105    BUN  7 - 18 mg/dL 42 Abnormally high     Creatinine  0.7 - 1.3 mg/dL 2 Abnormally high         11/3/2019  Battery  EMETERIO: 1.7-2.1 years            Interrogation Results  Atrial sensing: P wave: 1.0 mV  Atrial capture: A fib  Atrial lead impedance: 430 ohms  Ventricular sensing: R wave: 11.5 mV  Ventricular capture: 0.5 V @ 0.5 ms  Ventricular lead impedance: right  580 ohms     Parameters  Mode: DDDR  Base Rate: 60/120     Diagnostic Data  Atrial paced: <1%   Ventricular paced: 63 %  Mode switch: 100%  AT/AF [place]: 100%  AHR: Chronic A fib  VHR: 1     Intrinsic Rate: 62     Changes made: none     Conclusions: normal device function and Follow up in 6 months     Assessment:  1. SSS (sick sinus syndrome) (CMS/HCC)    2. Pacemaker          SUBJECTIVE:    No Known Allergies      Past Medical History:   Diagnosis Date   • Bradycardia    • Chronic renal failure, stage 3 (moderate) (CMS/HCC)    • Depression    • Hypertension    • Hypothyroidism    • Paroxysmal atrial fibrillation (CMS/HCC)    • Pneumonia          Past Surgical History:    Procedure Laterality Date   • APPENDECTOMY     • BACK SURGERY     • CATARACT EXTRACTION, BILATERAL     • INSERT / REPLACE / REMOVE PACEMAKER     • PACEMAKER IMPLANTATION     • PROSTATE SURGERY     • PROSTATE SURGERY           Family History   Problem Relation Age of Onset   • No Known Problems Mother    • No Known Problems Father          Social History     Socioeconomic History   • Marital status:      Spouse name: Not on file   • Number of children: Not on file   • Years of education: Not on file   • Highest education level: Not on file   Tobacco Use   • Smoking status: Former Smoker   • Smokeless tobacco: Never Used   Substance and Sexual Activity   • Alcohol use: No   • Drug use: No   • Sexual activity: Defer         Current Outpatient Medications   Medication Sig Dispense Refill   • apixaban (ELIQUIS) 2.5 MG tablet tablet Take 2.5 mg by mouth 2 (Two) Times a Day.     • Calcium Carb-Cholecalciferol (OYSTER SHELL CALCIUM/VITAMIN D) 250-125 MG-UNIT tablet tablet Take  by mouth 2 (Two) Times a Day.     • diltiazem CD (CARDIZEM CD) 180 MG 24 hr capsule Take 180 mg by mouth Daily.     • metoprolol tartrate (LOPRESSOR) 25 MG tablet Take 12.5 mg by mouth 2 (Two) Times a Day.     • Multiple Vitamins-Minerals (MULTIVITAMIN PO) Take  by mouth.     • predniSONE (DELTASONE) 2.5 MG tablet Take 2.5 mg by mouth Daily.     • sertraline (ZOLOFT) 50 MG tablet Take 50 mg by mouth Daily.       No current facility-administered medications for this visit.            Review of Systems:     Constitutional:  Denies recent weight loss, weight gain, fever or chills, no change in exercise tolerance.     HENT:  Denies any hearing loss, epistaxis, hoarseness, or difficulty speaking.     Eyes: Wears eyeglasses or contact lenses.    Respiratory:  Denies dyspnea with exertion,no cough, wheezing, or hemoptysis.     Cardiovascular: Negative for palpations, chest pain, orthopnea, PND, peripheral edema, syncope, or claudication.  "    Gastrointestinal:  Denies change in bowel habits, dyspepsia, ulcer disease, hematochezia, or melena.     Endocrine: Negative for cold intolerance, heat intolerance, polydipsia, polyphagia and polyuria. Denies any history of weight change, polydipsia, polyuria.     Genitourinary: Negative.      Musculoskeletal: Denies any history of arthritic symptoms or back problems.     Skin:  Denies any change in hair or nails, rashes, or skin lesions.     Allergic/Immunologic: Negative.  Negative for environmental allergies, food allergies and immunocompromised state.     Neurological:  Denies any history of recurrent headaches, strokes, TIA, or seizure disorder.     Hematological: Denies any food allergies, seasonal allergies, bleeding disorders, or lymphadenopathy.     Psychiatric/Behavioral: Denies any history of depression, substance abuse, or change in cognitive function.       OBJECTIVE:    /64   Pulse 68   Ht 175.3 cm (69\")   Wt 63 kg (139 lb)   SpO2 99%   BMI 20.53 kg/m²       Physical Exam:     Constitutional: Cooperative, alert and oriented, well-developed, well-nourished, in no acute distress.     HENT:   Head: Normocephalic, normal hair patterns, no masses or tenderness.  Ears, Nose, and Throat: No gross abnormalities. No pallor or cyanosis. Dentition good.   Eyes: EOMS intact, PERRL, conjunctivae and lids unremarkable. Fundoscopic exam and visual fields not performed.   Neck: No palpable masses or adenopathy, no thyromegaly, no JVD, carotid pulses are full and equal bilaterally and without  Bruits.     Cardiovascular: Regular rhythm, S1 and S2 normal, no S3 or S4. Apical impulse not displaced. No murmurs, gallops, or rubs detected.     Pulmonary/Chest: Chest: normal symmetry, no tenderness to palpation, normal respiratory excursion, no intercostal retraction, no use of accessory muscles. Pulmonary: Normal breath sounds. No rales or rhonchi.    Abdominal: Abdomen soft, bowel sounds normoactive, no " masses, no hepatosplenomegaly, non-tender, no bruits.     Musculoskeletal: No deformities, clubbing, cyanosis, erythema, or edema observed. There are no spinal abnormalities noted. Normal muscle strength and tone. Pulses full and equal in all extremities, no bruits auscultated.     Neurological: No gross motor or sensory deficits noted, affect appropriate, oriented to time, person, place.     Skin: Warm and dry to the touch, no apparent skin lesions or masses noted.     Psychiatric: He has a normal mood and affect. His behavior is normal. Judgment and thought content normal.         Procedures      Lab Results   Component Value Date    WBC 7.9 09/10/2019    HGB 12.6 (L) 09/10/2019    HCT 39.3 (L) 09/10/2019    MCV 93 09/10/2019     09/10/2019     Lab Results   Component Value Date    GLUCOSE 131 (H) 03/07/2014    BUN 42 (H) 09/10/2019    CREATININE 2 (H) 09/10/2019    CO2 26 03/07/2014    CALCIUM 9.5 09/10/2019    ALBUMIN 3.2 (L) 09/10/2019    AST 41 (H) 09/10/2019    ALT 30 09/10/2019     No results found for: CHOL  No results found for: TRIG  No results found for: HDL  No components found for: LDLCALC  No results found for: LDL  No results found for: HDLLDLRATIO  No components found for: CHOLHDL  No results found for: HGBA1C  Lab Results   Component Value Date    TSH 5.29 (H) 06/25/2018           ASSESSMENT AND PLAN:  #1 paroxysmal atrial fibrillations   #2 sick sinus syndrome status post pacemaker implantation   #3 hypertension with hypertensive heart disease   #4 mild-to-moderate tricuspid regurgitation        Clinically there is no sign of any acute cardiac decompensation based on the clinical history physical finding.  No evidence of active ischemia.  The patient is pleased with the clinical outcome.  He is doing remarkably well from cardiac point of view.  His younger than his stated age still physically very active and very good mental status.  The patient will continue apixaban 2.5 mg to decrease  risk of cardiac embolic phenomena and Cardizem with history of paroxysmal atrial fibrillation along with metoprolol.  I will see him back in 6 OR depends on patient clinical condition.  we will arrange an echocardiographic study to reassess the tricuspid regurgitations prior to the next visit    Jaime was seen today for follow-up.    Diagnoses and all orders for this visit:    Essential hypertension    Paroxysmal A-fib (CMS/HCC)    SSS (sick sinus syndrome) (CMS/HCC)    Non-rheumatic mitral regurgitation          Marcos Barba MD  12/3/2019  1:48 PM

## 2020-07-22 ENCOUNTER — CLINICAL SUPPORT (OUTPATIENT)
Dept: CARDIOLOGY | Facility: CLINIC | Age: 85
End: 2020-07-22

## 2020-07-22 DIAGNOSIS — I49.5 SSS (SICK SINUS SYNDROME) (HCC): Primary | ICD-10-CM

## 2020-07-22 DIAGNOSIS — Z95.0 PACEMAKER: ICD-10-CM

## 2020-07-22 PROCEDURE — 93288 INTERROG EVL PM/LDLS PM IP: CPT | Performed by: NURSE PRACTITIONER

## 2020-07-22 NOTE — PROGRESS NOTES
Pacemaker Evaluation Report    July 22, 2020    Primary Cardiologist: Dr. Barba  Implanting MD: Dr. Barba  :[employer] Model: [employer] DR RF 2210 Serial Number: [id number]  Implant date: 1/20/2011     Reason for evaluation: routine PPM Office  Cardiac device indication(s): sick sinus syndrome    Battery  EMETERIO: 5.3 months     Interrogation Results  Atrial sensing: P wave: 0.5 mV  Atrial capture: A fib  Atrial lead impedance: 430 ohms  Ventricular sensing: R wave: 10.4 mV  Ventricular capture: 0.5 V @ 0.5 ms  Ventricular lead impedance: right  560 ohms    Parameters  Mode: DDDR  Base Rate: 60/120    Diagnostic Data  Atrial paced: <1%   Ventricular paced: 68 %  Mode switch: 100%  AT/AF [place]: 100%  AHR: Chronic A fib  (On Eliquis)  VHR: 1    Office check in 2 months for EMETERIO    Intrinsic Rate: 64    Changes made: none    Conclusions: normal device function and Follow up in 6 months    Assessment:  1. SSS (sick sinus syndrome) (CMS/HCC)    2. Pacemaker                This document has been electronically signed by LYNDA Ellis on July 22, 2020 17:08

## 2020-09-23 ENCOUNTER — CLINICAL SUPPORT (OUTPATIENT)
Dept: CARDIOLOGY | Facility: CLINIC | Age: 85
End: 2020-09-23

## 2020-09-23 DIAGNOSIS — I49.5 SSS (SICK SINUS SYNDROME) (HCC): Primary | ICD-10-CM

## 2020-09-23 DIAGNOSIS — Z95.0 PACEMAKER: ICD-10-CM

## 2020-09-23 NOTE — PROGRESS NOTES
Pacemaker Evaluation Report    July 22, 2020    Primary Cardiologist: Dr. Barba  Implanting MD: Dr. Barba  :[employer] Model: [employer] DR RF 2210 Serial Number: [id number]  Implant date: 1/20/2011     Reason for evaluation: routine PPM Office battery check  Cardiac device indication(s): sick sinus syndrome    Battery  EMETERIO: > 3 months 2.62 V       Office check in 1 month for EMETERIO    Intrinsic Rate: 64    Changes made: none    Conclusions: normal device function and Follow up in 6 months    Assessment:  1. SSS (sick sinus syndrome) (CMS/HCC)    2. Pacemaker                This document has been electronically signed by LYNDA Ellis on September 25, 2020 08:49 CDT

## 2020-09-25 PROCEDURE — 93288 INTERROG EVL PM/LDLS PM IP: CPT | Performed by: NURSE PRACTITIONER

## 2020-10-28 ENCOUNTER — CLINICAL SUPPORT (OUTPATIENT)
Dept: CARDIOLOGY | Facility: CLINIC | Age: 85
End: 2020-10-28

## 2020-10-28 DIAGNOSIS — Z95.0 PACEMAKER: ICD-10-CM

## 2020-10-28 DIAGNOSIS — I49.5 SSS (SICK SINUS SYNDROME) (HCC): Primary | ICD-10-CM

## 2020-10-28 PROCEDURE — 93288 INTERROG EVL PM/LDLS PM IP: CPT | Performed by: NURSE PRACTITIONER

## 2020-10-28 NOTE — PROGRESS NOTES
Pacemaker Evaluation Report    October 28, 2020    Primary Cardiologist: Dr. Barba  Implanting MD: Dr. Barba  :[employer]ott Model: Accent DR RF 2210 Serial Number: [id number]  Implant date: 1/20/2011     Reason for evaluation: routine PPM Office battery check  Cardiac device indication(s): sick sinus syndrome    Battery  EMETERIO: > 3 months 2.60 V    Will discuss with Dr Barba about scheduling generator change.    Intrinsic Rate: 64    Changes made: none    Conclusions: normal device function and Follow up in 6 months    Assessment:  1. SSS (sick sinus syndrome) (CMS/HCC)    2. Pacemaker              This document has been electronically signed by LYNDA Ponce on October 28, 2020 16:08 CDT

## 2020-11-02 ENCOUNTER — TELEPHONE (OUTPATIENT)
Dept: CARDIOLOGY | Facility: CLINIC | Age: 85
End: 2020-11-02

## 2020-11-02 DIAGNOSIS — Z79.01 CURRENT USE OF LONG TERM ANTICOAGULATION: Primary | ICD-10-CM

## 2020-11-02 DIAGNOSIS — Z45.018 PACEMAKER END OF LIFE: ICD-10-CM

## 2020-11-02 NOTE — TELEPHONE ENCOUNTER
pts dtr Bhavani came in and was wondering if the gen change on his pacemaker has been scheduled yet?  Please call Bhavani at [phone] to schedule

## 2020-11-02 NOTE — TELEPHONE ENCOUNTER
PPM gen change scheduled for Thursday November 19th. Covid test will be Monday November 14th. Daughter Bhavani given all info and instructions. She verbalized understanding

## 2020-11-12 DIAGNOSIS — Z45.018 PACEMAKER END OF LIFE: Primary | ICD-10-CM

## 2020-11-12 DIAGNOSIS — Z79.01 CURRENT USE OF LONG TERM ANTICOAGULATION: ICD-10-CM

## 2020-11-12 DIAGNOSIS — I49.5 SSS (SICK SINUS SYNDROME) (HCC): ICD-10-CM

## 2020-11-12 RX ORDER — BUPIVACAINE HCL/0.9 % NACL/PF 0.1 %
2 PLASTIC BAG, INJECTION (ML) EPIDURAL ONCE
Status: CANCELLED | OUTPATIENT
Start: 2020-11-19 | End: 2020-11-12

## 2020-11-12 RX ORDER — SODIUM CHLORIDE 9 MG/ML
50 INJECTION, SOLUTION INTRAVENOUS CONTINUOUS
Status: CANCELLED | OUTPATIENT
Start: 2020-11-19

## 2020-11-16 ENCOUNTER — LAB (OUTPATIENT)
Dept: LAB | Facility: HOSPITAL | Age: 85
End: 2020-11-16

## 2020-11-16 DIAGNOSIS — Z01.818 PREOP TESTING: Primary | ICD-10-CM

## 2020-11-16 PROCEDURE — U0003 INFECTIOUS AGENT DETECTION BY NUCLEIC ACID (DNA OR RNA); SEVERE ACUTE RESPIRATORY SYNDROME CORONAVIRUS 2 (SARS-COV-2) (CORONAVIRUS DISEASE [COVID-19]), AMPLIFIED PROBE TECHNIQUE, MAKING USE OF HIGH THROUGHPUT TECHNOLOGIES AS DESCRIBED BY CMS-2020-01-R: HCPCS

## 2020-11-16 PROCEDURE — C9803 HOPD COVID-19 SPEC COLLECT: HCPCS

## 2020-11-17 LAB
COVID LABCORP PRIORITY: NORMAL
SARS-COV-2 RNA RESP QL NAA+PROBE: NOT DETECTED

## 2020-11-19 ENCOUNTER — ANESTHESIA EVENT (OUTPATIENT)
Dept: CARDIOLOGY | Facility: HOSPITAL | Age: 85
End: 2020-11-19

## 2020-11-19 ENCOUNTER — ANESTHESIA (OUTPATIENT)
Dept: CARDIOLOGY | Facility: HOSPITAL | Age: 85
End: 2020-11-19

## 2020-11-19 ENCOUNTER — HOSPITAL ENCOUNTER (OUTPATIENT)
Facility: HOSPITAL | Age: 85
Setting detail: HOSPITAL OUTPATIENT SURGERY
Discharge: HOME | End: 2020-11-19
Attending: INTERNAL MEDICINE | Admitting: INTERNAL MEDICINE

## 2020-11-19 VITALS
HEIGHT: 69 IN | TEMPERATURE: 98.8 F | RESPIRATION RATE: 18 BRPM | WEIGHT: 143.52 LBS | DIASTOLIC BLOOD PRESSURE: 53 MMHG | BODY MASS INDEX: 21.26 KG/M2 | HEART RATE: 63 BPM | SYSTOLIC BLOOD PRESSURE: 110 MMHG | OXYGEN SATURATION: 98 %

## 2020-11-19 DIAGNOSIS — I49.5 SSS (SICK SINUS SYNDROME) (HCC): ICD-10-CM

## 2020-11-19 DIAGNOSIS — Z79.01 CURRENT USE OF LONG TERM ANTICOAGULATION: ICD-10-CM

## 2020-11-19 DIAGNOSIS — Z45.018 PACEMAKER END OF LIFE: ICD-10-CM

## 2020-11-19 LAB
ANION GAP SERPL CALCULATED.3IONS-SCNC: 11 MMOL/L (ref 5–15)
BASOPHILS # BLD AUTO: 0.02 10*3/MM3 (ref 0–0.2)
BASOPHILS NFR BLD AUTO: 0.4 % (ref 0–1.5)
BUN SERPL-MCNC: 38 MG/DL (ref 8–23)
BUN/CREAT SERPL: 19.8 (ref 7–25)
CALCIUM SPEC-SCNC: 9.5 MG/DL (ref 8.2–9.6)
CHLORIDE SERPL-SCNC: 107 MMOL/L (ref 98–107)
CO2 SERPL-SCNC: 25 MMOL/L (ref 22–29)
CREAT SERPL-MCNC: 1.92 MG/DL (ref 0.76–1.27)
DEPRECATED RDW RBC AUTO: 48.5 FL (ref 37–54)
EOSINOPHIL # BLD AUTO: 0.07 10*3/MM3 (ref 0–0.4)
EOSINOPHIL NFR BLD AUTO: 1.3 % (ref 0.3–6.2)
ERYTHROCYTE [DISTWIDTH] IN BLOOD BY AUTOMATED COUNT: 13.7 % (ref 12.3–15.4)
GFR SERPL CREATININE-BSD FRML MDRD: 33 ML/MIN/1.73
GLUCOSE SERPL-MCNC: 95 MG/DL (ref 65–99)
HCT VFR BLD AUTO: 41.2 % (ref 37.5–51)
HGB BLD-MCNC: 13.3 G/DL (ref 13–17.7)
IMM GRANULOCYTES # BLD AUTO: 0.05 10*3/MM3 (ref 0–0.05)
IMM GRANULOCYTES NFR BLD AUTO: 0.9 % (ref 0–0.5)
INR PPP: 1 (ref 0.8–1.2)
LYMPHOCYTES # BLD AUTO: 0.78 10*3/MM3 (ref 0.7–3.1)
LYMPHOCYTES NFR BLD AUTO: 14 % (ref 19.6–45.3)
MCH RBC QN AUTO: 30.7 PG (ref 26.6–33)
MCHC RBC AUTO-ENTMCNC: 32.3 G/DL (ref 31.5–35.7)
MCV RBC AUTO: 95.2 FL (ref 79–97)
MONOCYTES # BLD AUTO: 0.4 10*3/MM3 (ref 0.1–0.9)
MONOCYTES NFR BLD AUTO: 7.2 % (ref 5–12)
NEUTROPHILS NFR BLD AUTO: 4.26 10*3/MM3 (ref 1.7–7)
NEUTROPHILS NFR BLD AUTO: 76.2 % (ref 42.7–76)
NRBC BLD AUTO-RTO: 0 /100 WBC (ref 0–0.2)
PLATELET # BLD AUTO: 148 10*3/MM3 (ref 140–450)
PMV BLD AUTO: 10.8 FL (ref 6–12)
POTASSIUM SERPL-SCNC: 4.6 MMOL/L (ref 3.5–5.2)
PROTHROMBIN TIME: 13.6 SECONDS (ref 11.1–15.3)
RBC # BLD AUTO: 4.33 10*6/MM3 (ref 4.14–5.8)
SODIUM SERPL-SCNC: 143 MMOL/L (ref 136–145)
WBC # BLD AUTO: 5.58 10*3/MM3 (ref 3.4–10.8)

## 2020-11-19 PROCEDURE — 33228 REMV&REPLC PM GEN DUAL LEAD: CPT | Performed by: INTERNAL MEDICINE

## 2020-11-19 PROCEDURE — 25010000002 FENTANYL CITRATE (PF) 100 MCG/2ML SOLUTION: Performed by: NURSE ANESTHETIST, CERTIFIED REGISTERED

## 2020-11-19 PROCEDURE — 85025 COMPLETE CBC W/AUTO DIFF WBC: CPT | Performed by: INTERNAL MEDICINE

## 2020-11-19 PROCEDURE — 80048 BASIC METABOLIC PNL TOTAL CA: CPT | Performed by: INTERNAL MEDICINE

## 2020-11-19 PROCEDURE — S0260 H&P FOR SURGERY: HCPCS | Performed by: INTERNAL MEDICINE

## 2020-11-19 PROCEDURE — C1785 PMKR, DUAL, RATE-RESP: HCPCS | Performed by: INTERNAL MEDICINE

## 2020-11-19 PROCEDURE — 85610 PROTHROMBIN TIME: CPT | Performed by: INTERNAL MEDICINE

## 2020-11-19 PROCEDURE — 25010000002 PROPOFOL 10 MG/ML EMULSION: Performed by: NURSE ANESTHETIST, CERTIFIED REGISTERED

## 2020-11-19 DEVICE — GEN PM ASSURITY MRI DR RF PM2272: Type: IMPLANTABLE DEVICE | Status: FUNCTIONAL

## 2020-11-19 RX ORDER — LIDOCAINE HYDROCHLORIDE 20 MG/ML
INJECTION, SOLUTION INFILTRATION; PERINEURAL AS NEEDED
Status: DISCONTINUED | OUTPATIENT
Start: 2020-11-19 | End: 2020-11-19 | Stop reason: HOSPADM

## 2020-11-19 RX ORDER — BUPIVACAINE HCL/0.9 % NACL/PF 0.1 %
2 PLASTIC BAG, INJECTION (ML) EPIDURAL ONCE
Status: COMPLETED | OUTPATIENT
Start: 2020-11-19 | End: 2020-11-19

## 2020-11-19 RX ORDER — SODIUM CHLORIDE 9 MG/ML
50 INJECTION, SOLUTION INTRAVENOUS CONTINUOUS
Status: DISCONTINUED | OUTPATIENT
Start: 2020-11-19 | End: 2020-11-19 | Stop reason: HOSPADM

## 2020-11-19 RX ORDER — FENTANYL CITRATE 50 UG/ML
INJECTION, SOLUTION INTRAMUSCULAR; INTRAVENOUS AS NEEDED
Status: DISCONTINUED | OUTPATIENT
Start: 2020-11-19 | End: 2020-11-19 | Stop reason: SURG

## 2020-11-19 RX ADMIN — PROPOFOL 25 MCG/KG/MIN: 10 INJECTION, EMULSION INTRAVENOUS at 09:40

## 2020-11-19 RX ADMIN — Medication 2 G: at 09:39

## 2020-11-19 RX ADMIN — SODIUM CHLORIDE 50 ML/HR: 9 INJECTION, SOLUTION INTRAVENOUS at 08:02

## 2020-11-19 RX ADMIN — FENTANYL CITRATE 25 MCG: 50 INJECTION, SOLUTION INTRAMUSCULAR; INTRAVENOUS at 09:37

## 2020-11-19 NOTE — ANESTHESIA POSTPROCEDURE EVALUATION
Patient: Jaime Velez    Procedure Summary     Date: 11/19/20 Room / Location: [facility] CATH/EP LAB 3 / [facility], [place] CATH INVASIVE LOCATION    Anesthesia Start: 0934 Anesthesia Stop: 1034    Procedure: PPM generator change - dual (N/A ) Diagnosis:       Current use of long term anticoagulation      Pacemaker end of life    Provider: Marcos Barba MD Provider: Erick Nelson MD    Anesthesia Type: MAC ASA Status: 3          Anesthesia Type: MAC    Vitals  No vitals data found for the desired time range.          Post Anesthesia Care and Evaluation    Patient location during evaluation: PHASE II  Patient participation: complete - patient participated  Level of consciousness: sleepy but conscious  Pain management: adequate  Airway patency: patent  Anesthetic complications: No anesthetic complications  PONV Status: none  Cardiovascular status: acceptable  Respiratory status: acceptable  Hydration status: acceptable    Comments: ---------------------------               11/19/20                      0755         ---------------------------   BP:          114/68        Pulse:         67           Resp:          18           Temp:   97.7 °F (36.5 °C)   SpO2:         100%         ---------------------------

## 2020-11-19 NOTE — H&P
Cardiology at [facility] History and Physical Note      Jaime Velez  Pool/NONE  [id number]  3/22/1926    DATE OF ADMISSION: 11/19/2020    Reason for Hospitalization: Pacemaker reached to EMETERIO status    History of Present Illness:    89 years old patient recently pacemaker interrogated and noted reached EMETERIO status and schedule to undergo generator replacement.  Patient denies symptoms testing of cardiac decompensation with history of sick sinus syndrome paroxysmal atrial fibrillation status post pacemaker implantation and hypertension presented for routine follow-up.  He seemed to be younger than his stated age , very good mental status still physically very active and good sense of humor.    No Known Allergies    Prior to Admission medications    Medication Sig Start Date End Date Taking? Authorizing Provider   Calcium Carb-Cholecalciferol (OYSTER SHELL CALCIUM/VITAMIN D) 250-125 MG-UNIT tablet tablet Take  by mouth 2 (Two) Times a Day.   Yes    diltiazem CD (CARDIZEM CD) 180 MG 24 hr capsule Take 180 mg by mouth Daily.   Yes Nancy Macias MD   metoprolol tartrate (LOPRESSOR) 25 MG tablet Take 12.5 mg by mouth 2 (Two) Times a Day.   Yes Nancy Macias MD   Multiple Vitamins-Minerals (MULTIVITAMIN PO) Take  by mouth.   Yes Nancy Macias MD   predniSONE (DELTASONE) 2.5 MG tablet Take 2.5 mg by mouth Daily.   Yes Nancy Macias MD   sertraline (ZOLOFT) 50 MG tablet Take 50 mg by mouth Daily.   Yes Nancy Macias MD   apixaban (ELIQUIS) 2.5 MG tablet tablet Take 2.5 mg by mouth 2 (Two) Times a Day.    Nancy Macias MD       Past Medical History:   Diagnosis Date   • Bradycardia    • Chronic renal failure, stage 3 (moderate)    • Depression    • Hypertension    • Hypothyroidism    • Paroxysmal atrial fibrillation (CMS/HCC)    • Pneumonia        Past Surgical History:   Procedure Laterality Date   • APPENDECTOMY     • BACK SURGERY     • CATARACT EXTRACTION,  "BILATERAL     • INSERT / REPLACE / REMOVE PACEMAKER     • PACEMAKER IMPLANTATION     • PROSTATE SURGERY     • PROSTATE SURGERY         Social History     Socioeconomic History   • Marital status:      Spouse name: Not on file   • Number of children: Not on file   • Years of education: Not on file   • Highest education level: Not on file   Tobacco Use   • Smoking status: Former Smoker   • Smokeless tobacco: Never Used   Substance and Sexual Activity   • Alcohol use: No   • Drug use: No   • Sexual activity: Defer       Family History   Problem Relation Age of Onset   • No Known Problems Mother    • No Known Problems Father        Patient Active Problem List   Diagnosis   • Chronic renal failure, stage 3 (moderate)   • Pneumonia   • Dyspnea   • Essential hypertension   • Paroxysmal A-fib (CMS/HCC)   • SSS (sick sinus syndrome) (CMS/HCC)   • Non-rheumatic mitral regurgitation   • Pacemaker   • Current use of long term anticoagulation   • Pacemaker end of life        REVIEW OF SYSTEMS:   Review of Systems    12 point ROS was performed and is negative except as outlined in HPI.       Objective:     Vitals:    11/18/20 1241 11/19/20 0755   BP:  168/75   Pulse:  67   Resp:  18   Temp:  97.7 °F (36.5 °C)   TempSrc:  Temporal   SpO2:  100%   Weight: 72.6 kg (160 lb) 65.1 kg (143 lb 8.3 oz)   Height:  175.3 cm (69\")     Body mass index is 21.19 kg/m².  Flowsheet Rows      First Filed Value   Admission Height  175.3 cm (69\") Documented at 11/19/2020 0755   Admission Weight  72.6 kg (160 lb) Documented at 11/18/2020 1241        No intake or output data in the 24 hours ending 11/19/20 0827      Physical Exam:  Constitutional: Oriented to person, place, and time.  Well-developed and well-nourished. No distress.   HENT: Normocephalic.   Eyes: Conjunctivae are normal. No scleral icterus.   Neck: Normal carotid pulses, no hepatojugular reflux and no JVD present. Carotid bruit is not present. No tracheal deviation, no edema and " no erythema present. No thyromegaly present.   Cardiovascular: Normal rate, regular rhythm, S1 normal, S2 normal, normal heart sounds and intact distal pulses.   No extrasystoles are present.   Pulmonary/Chest: Effort normal and breath sounds normal. No respiratory distress.   Abdominal: Soft. Bowel sounds are normal. Exhibits no distension and no mass.   Musculoskeletal:  Exhibits no edema, no tenderness.   Neurological: Alert and awake.  Skin: Skin warm and dry.  Psychiatric: Normal mood and affect.      Lab Review:                    Results from last 7 days   Lab Units 11/19/20  0804   WBC 10*3/mm3 5.58   HEMOGLOBIN g/dL 13.3   HEMATOCRIT % 41.2   PLATELETS 10*3/mm3 148                         I personally viewed and interpreted the patient's EKG/Telemetry data.      Assessment/Plan:       #1 paroxysmal atrial fibrillations   #2 sick sinus syndrome status post pacemaker implantation  reached to EMETERIO status  #3 hypertension with hypertensive heart disease    Clinically there is no sign of any acute cardiac decompensation based on the clinical history physical finding.  No evidence of active ischemia.  The patient is pleased with the clinical outcome.  He is doing remarkably well from cardiac point of view.  His younger than his stated age still physically very active and very good mental status.  Procedure risk regarding the generator replacement discussed with the patient.  Risk included but not limited to infection, bleeding, stroke, pneumothorax or hematoma.  Understand willing to proceed forward.  He is a Mallampati score 2 and ASA class II.  Will resume home medication after the procedure    Thank you for allowing me to participate in the care of Jaime Velez. Feel free to contact me directly with any further questions or concerns.    Marcos Barba MD  11/19/20  08:27 CST.    Part of this note may be an electronic transcription/translation of spoken language to printed text using the Dragon Dictation  system.

## 2020-11-19 NOTE — ANESTHESIA PREPROCEDURE EVALUATION
Anesthesia Evaluation     no history of anesthetic complications:  NPO Solid Status: > 8 hours  NPO Liquid Status: > 2 hours           Airway   Mallampati: II  TM distance: >3 FB  Neck ROM: full  Possible difficult intubation and Small opening  Dental    (+) poor dentition, partials and upper dentures        Pulmonary     breath sounds clear to auscultation  (+) shortness of breath,   (-) COPD, asthma, sleep apnea, not a smoker    ROS comment: Sinus drainage  Cardiovascular   Exercise tolerance: poor (<4 METS)    PT is on anticoagulation therapy  Patient on routine beta blocker and Beta blocker given within 24 hours of surgery  Rhythm: regular  Rate: normal    (+) pacemaker pacemaker, hypertension poorly controlled 2 medications or greater, valvular problems/murmurs MR, dysrhythmias Atrial Fib,   (-) past MI, angina, cardiac stents, DVT, hyperlipidemia      Neuro/Psych  (+) psychiatric history Depression,     (-) seizures, TIA, CVA, headaches, numbness  GI/Hepatic/Renal/Endo    (+)   renal disease CRI,   (-) GERD, hepatitis, liver disease, diabetes, no thyroid disorder    Musculoskeletal     (+) arthralgias,   Abdominal    Substance History   (-) alcohol use, drug use     OB/GYN          Other   arthritis, chronic steroid use      (-) history of cancer  ROS/Med Hx Other: Stopped eliquid 2 days ago  Hard of hearing    AAOx3, slow mentation, decreased short term memory                  Anesthesia Plan    ASA 3     MAC   total IV anesthesia  intravenous induction     Anesthetic plan, all risks, benefits, and alternatives have been provided, discussed and informed consent has been obtained with: patient.

## 2020-11-19 NOTE — DISCHARGE INSTRUCTIONS
Hold Eliquis for 2 days    Keep it dry for 1 week    Do not lift left arm above the shoulder    Wound checkup with the pacer clinic on Friday and then in 1 week

## 2020-12-16 ENCOUNTER — OFFICE VISIT (OUTPATIENT)
Dept: CARDIOLOGY | Facility: CLINIC | Age: 85
End: 2020-12-16

## 2020-12-16 VITALS
SYSTOLIC BLOOD PRESSURE: 128 MMHG | BODY MASS INDEX: 21.18 KG/M2 | DIASTOLIC BLOOD PRESSURE: 70 MMHG | OXYGEN SATURATION: 99 % | HEIGHT: 69 IN | HEART RATE: 69 BPM | WEIGHT: 143 LBS

## 2020-12-16 DIAGNOSIS — I49.5 SSS (SICK SINUS SYNDROME) (HCC): ICD-10-CM

## 2020-12-16 DIAGNOSIS — I10 ESSENTIAL HYPERTENSION: Primary | ICD-10-CM

## 2020-12-16 DIAGNOSIS — I34.0 NON-RHEUMATIC MITRAL REGURGITATION: ICD-10-CM

## 2020-12-16 DIAGNOSIS — I48.0 PAROXYSMAL A-FIB (HCC): ICD-10-CM

## 2020-12-16 PROCEDURE — 99024 POSTOP FOLLOW-UP VISIT: CPT | Performed by: INTERNAL MEDICINE

## 2020-12-16 RX ORDER — DILTIAZEM HYDROCHLORIDE 180 MG/1
180 CAPSULE, COATED, EXTENDED RELEASE ORAL DAILY
Qty: 90 CAPSULE | Refills: 3 | Status: SHIPPED | OUTPATIENT
Start: 2020-12-16 | End: 2022-02-20 | Stop reason: HOSPADM

## 2020-12-16 NOTE — PROGRESS NOTES
Jaime Velez  89 y.o. male    12/16/2020     1. Essential hypertension    2. Paroxysmal A-fib (CMS/HCC)    3. SSS (sick sinus syndrome) (CMS/HCC)    4. Non-rheumatic mitral regurgitation        History of Present Illness:    Patient's Body mass index is 21.11 kg/m². BMI is within normal parameters. No follow-up required..    89 years old patient presented for routine routine follow-up physically active good mental status and doing well from cardiac point of view with history of sick sinus syndrome paroxysmal atrial fibrillation status post pacemaker implantation and hypertension presented for routine follow-up.  He seemed to be younger than his stated age , very good mental status still physically very active and good sense of humor.  Previous echocardiographic study 2016 reported normal left  systolic function with the mild-to-moderate tricuspid regurgitations will arrange echocardiographic study to reassess the biventricular function and regurgitation status        9/2019  Glucose  70 - 110 mg/dL 105    BUN  7 - 18 mg/dL 42 Abnormally high     Creatinine  0.7 - 1.3 mg/dL 2 Abnormally high       October 28, 2020     Primary Cardiologist: Dr. Barba  Implanting MD: Dr. Barba  :[employer] Model: Algramo DR [id number] Serial Number: [id number]  Implant date: 1/20/2011     Reason for evaluation: routine PPM Office battery check  Cardiac device indication(s): sick sinus syndrome     Battery  EMETERIO: > 3 months 2.60 V     Will discuss with Dr Barba about scheduling generator change.     Intrinsic Rate: 64     Changes made: none     Conclusions: normal device function and Follow up in 6 months     Assessment:  1. SSS (sick sinus syndrome) (CMS/HCC)    2. Pacemaker        11/3/2019  Battery  EMETERIO: 1.7-2.1 years            Interrogation Results  Atrial sensing: P wave: 1.0 mV  Atrial capture: A fib  Atrial lead impedance: 430 ohms  Ventricular sensing: R wave: 11.5 mV  Ventricular capture: 0.5 V @ 0.5 ms  Ventricular lead  impedance: right  580 ohms     Parameters  Mode: DDDR  Base Rate: 60/120     Diagnostic Data  Atrial paced: <1%   Ventricular paced: 63 %  Mode switch: 100%  AT/AF [place]: 100%  AHR: Chronic A fib  VHR: 1     Intrinsic Rate: 62     Changes made: none     Conclusions: normal device function and Follow up in 6 months     Assessment:  1. SSS (sick sinus syndrome) (CMS/HCC)    2. Pacemaker          SUBJECTIVE:    No Known Allergies      Past Medical History:   Diagnosis Date   • Bradycardia    • Chronic renal failure, stage 3 (moderate)    • Depression    • Hypertension    • Hypothyroidism    • Paroxysmal atrial fibrillation (CMS/HCC)    • Pneumonia          Past Surgical History:   Procedure Laterality Date   • APPENDECTOMY     • BACK SURGERY     • CARDIAC ELECTROPHYSIOLOGY PROCEDURE N/A 11/19/2020    Procedure: PPM generator change - dual;  Surgeon: Marcos Barba MD;  Location: [facility] INVASIVE LOCATION;  Service: Cardiology;  Laterality: N/A;   • CATARACT EXTRACTION, BILATERAL     • INSERT / REPLACE / REMOVE PACEMAKER     • PACEMAKER IMPLANTATION     • PROSTATE SURGERY     • PROSTATE SURGERY           Family History   Problem Relation Age of Onset   • No Known Problems Mother    • No Known Problems Father          Social History     Socioeconomic History   • Marital status:      Spouse name: Not on file   • Number of children: Not on file   • Years of education: Not on file   • Highest education level: Not on file   Tobacco Use   • Smoking status: Former Smoker   • Smokeless tobacco: Never Used   Substance and Sexual Activity   • Alcohol use: No   • Drug use: No   • Sexual activity: Defer         Current Outpatient Medications   Medication Sig Dispense Refill   • apixaban (ELIQUIS) 2.5 MG tablet tablet Take 2.5 mg by mouth 2 (Two) Times a Day.     • Calcium Carb-Cholecalciferol (OYSTER SHELL CALCIUM/VITAMIN D) 250-125 MG-UNIT tablet tablet Take  by mouth 2 (Two) Times a Day.     • diltiazem CD (CARDIZEM  "CD) 180 MG 24 hr capsule Take 180 mg by mouth Daily.     • metoprolol tartrate (LOPRESSOR) 25 MG tablet Take 12.5 mg by mouth 2 (Two) Times a Day.     • Multiple Vitamins-Minerals (MULTIVITAMIN PO) Take  by mouth.     • predniSONE (DELTASONE) 2.5 MG tablet Take 2.5 mg by mouth Daily.     • sertraline (ZOLOFT) 50 MG tablet Take 50 mg by mouth Daily.       No current facility-administered medications for this visit.            Review of Systems:     Constitutional:  Denies recent weight loss, weight gain, fever or chills, no change in exercise tolerance.     HENT:  Denies any hearing loss, epistaxis, hoarseness, or difficulty speaking.     Eyes: No blurring    Respiratory:  Denies dyspnea with exertion,no cough, wheezing, or hemoptysis.     Cardiovascular: See H&P    Gastrointestinal:  Denies change in bowel habits, dyspepsia, ulcer disease, hematochezia, or melena.     Endocrine: Negative for cold intolerance, heat intolerance, polydipsia, polyphagia and polyuria. Denies any history of weight change, polydipsia, polyuria.     Genitourinary: Negative.      Musculoskeletal: Denies any history of arthritic symptoms or back problems.     Skin:  Denies any change in hair or nails, rashes, or skin lesions.     Allergic/Immunologic: Negative.  Negative for environmental allergies, food allergies and immunocompromised state.     Neurological:  Denies any history of recurrent headaches, strokes, TIA, or seizure disorder.     Hematological: Denies any food allergies, seasonal allergies, bleeding disorders, or lymphadenopathy.     Psychiatric/Behavioral: Denies any history of depression, substance abuse, or change in cognitive function.       OBJECTIVE:    /70 (BP Location: Left arm, Patient Position: Sitting, Cuff Size: Adult)   Pulse 69   Ht 175.3 cm (69.02\")   Wt 64.9 kg (143 lb)   SpO2 99%   BMI 21.11 kg/m²       Physical Exam:     Constitutional: Cooperative, alert and oriented, well-developed, well-nourished, " in no acute distress.     HENT:   Head: Normocephalic, normal hair patterns, no masses or tenderness.  Ears, Nose, and Throat: No gross abnormalities. No pallor or cyanosis. Dentition good.   Eyes: EOMS intact, PERRL, conjunctivae and lids unremarkable. Fundoscopic exam and visual fields not performed.   Neck: No palpable masses or adenopathy, no thyromegaly, no JVD, carotid pulses are full and equal bilaterally and without  Bruits.     Cardiovascular: Regular rhythm, S1 and S2 normal, no S3 or S4. Apical impulse not displaced. No murmurs, gallops, or rubs detected.     Pulmonary/Chest: Chest: normal symmetry, no tenderness to palpation, normal respiratory excursion, no intercostal retraction, no use of accessory muscles. Pulmonary: Normal breath sounds. No rales or rhonchi.    Abdominal: Abdomen soft, bowel sounds normoactive, no masses, no hepatosplenomegaly, non-tender, no bruits.     Musculoskeletal: No deformities, clubbing, cyanosis, erythema, or edema observed. There are no spinal abnormalities noted. Normal muscle strength and tone. Pulses full and equal in all extremities, no bruits auscultated.     Neurological: No gross motor or sensory deficits noted, affect appropriate, oriented to time, person, place.     Skin: Warm and dry to the touch, no apparent skin lesions or masses noted.     Psychiatric: He has a normal mood and affect. His behavior is normal. Judgment and thought content normal.         Procedures      Lab Results   Component Value Date    WBC 5.58 11/19/2020    HGB 13.3 11/19/2020    HCT 41.2 11/19/2020    MCV 95.2 11/19/2020     11/19/2020     Lab Results   Component Value Date    GLUCOSE 95 11/19/2020    BUN 38 (H) 11/19/2020    CREATININE 1.92 (H) 11/19/2020    EGFRIFNONA 33 (L) 11/19/2020    BCR 19.8 11/19/2020    CO2 25.0 11/19/2020    CALCIUM 9.5 11/19/2020    ALBUMIN 3.2 (L) 09/10/2019    AST 41 (H) 09/10/2019    ALT 30 09/10/2019     No results found for: CHOL  No results found  for: TRIG  No results found for: HDL  No components found for: LDLCALC  No results found for: LDL  No results found for: HDLLDLRATIO  No components found for: CHOLHDL  No results found for: HGBA1C  Lab Results   Component Value Date    TSH 5.29 (H) 06/25/2018           ASSESSMENT AND PLAN:  #1 paroxysmal atrial fibrillations   Doing well we will continue Eliquis to decrease risk of cardiac embolization, metoprolol and diltiazem as a part of AV gustabo blocking drug    #2 sick sinus syndrome status post pacemaker implantation     Continue follow-up with pacer clinic as per schedule appointment.  Pacemaker interrogated in October 2020 and battery voltage good for greater than 3 months    #3 hypertension with hypertensive heart disease    Good blood pressure and will continue metoprolol and Cardizem     #4 mild-to-moderate tricuspid regurgitation     arrange an echocardiogram to reassess the valvular status given last  echo in 2016         Diagnoses and all orders for this visit:    1. Essential hypertension (Primary)  -     ECG 12 Lead    2. Paroxysmal A-fib (CMS/HCC)  -     Adult Transthoracic Echo Complete W/ Cont if Necessary Per Protocol; Future    3. SSS (sick sinus syndrome) (CMS/HCC)  -     Adult Transthoracic Echo Complete W/ Cont if Necessary Per Protocol; Future    4. Non-rheumatic mitral regurgitation  -     Adult Transthoracic Echo Complete W/ Cont if Necessary Per Protocol; Future          Marcos Barba MD  12/16/2020  15:02 CST

## 2021-07-22 ENCOUNTER — TELEPHONE (OUTPATIENT)
Dept: CARDIOLOGY | Facility: CLINIC | Age: 86
End: 2021-07-22

## 2022-01-18 ENCOUNTER — APPOINTMENT (OUTPATIENT)
Dept: GENERAL RADIOLOGY | Facility: HOSPITAL | Age: 87
End: 2022-01-18

## 2022-01-18 ENCOUNTER — APPOINTMENT (OUTPATIENT)
Dept: CT IMAGING | Facility: HOSPITAL | Age: 87
End: 2022-01-18

## 2022-01-18 ENCOUNTER — HOSPITAL ENCOUNTER (EMERGENCY)
Facility: HOSPITAL | Age: 87
Discharge: HOME | End: 2022-01-18
Attending: FAMILY MEDICINE | Admitting: FAMILY MEDICINE

## 2022-01-18 VITALS
BODY MASS INDEX: 22.63 KG/M2 | OXYGEN SATURATION: 97 % | DIASTOLIC BLOOD PRESSURE: 71 MMHG | HEIGHT: 69 IN | TEMPERATURE: 98.1 F | SYSTOLIC BLOOD PRESSURE: 141 MMHG | RESPIRATION RATE: 18 BRPM | WEIGHT: 152.8 LBS | HEART RATE: 67 BPM

## 2022-01-18 DIAGNOSIS — R53.83 FATIGUE, UNSPECIFIED TYPE: ICD-10-CM

## 2022-01-18 DIAGNOSIS — U07.1 COVID-19: Primary | ICD-10-CM

## 2022-01-18 LAB
ALBUMIN SERPL-MCNC: 4 G/DL (ref 3.5–5.2)
ALBUMIN/GLOB SERPL: 1.9 G/DL
ALP SERPL-CCNC: 60 U/L (ref 39–117)
ALT SERPL W P-5'-P-CCNC: 35 U/L (ref 1–41)
ANION GAP SERPL CALCULATED.3IONS-SCNC: 11 MMOL/L (ref 5–15)
AST SERPL-CCNC: 42 U/L (ref 1–40)
BASOPHILS # BLD AUTO: 0.02 10*3/MM3 (ref 0–0.2)
BASOPHILS NFR BLD AUTO: 0.4 % (ref 0–1.5)
BILIRUB SERPL-MCNC: 0.2 MG/DL (ref 0–1.2)
BILIRUB UR QL STRIP: NEGATIVE
BUN SERPL-MCNC: 34 MG/DL (ref 8–23)
BUN/CREAT SERPL: 16.8 (ref 7–25)
CALCIUM SPEC-SCNC: 8.7 MG/DL (ref 8.2–9.6)
CHLORIDE SERPL-SCNC: 102 MMOL/L (ref 98–107)
CK SERPL-CCNC: 130 U/L (ref 20–200)
CLARITY UR: CLEAR
CO2 SERPL-SCNC: 22 MMOL/L (ref 22–29)
COLOR UR: YELLOW
CREAT SERPL-MCNC: 2.02 MG/DL (ref 0.76–1.27)
DEPRECATED RDW RBC AUTO: 47.2 FL (ref 37–54)
EOSINOPHIL # BLD AUTO: 0 10*3/MM3 (ref 0–0.4)
EOSINOPHIL NFR BLD AUTO: 0 % (ref 0.3–6.2)
ERYTHROCYTE [DISTWIDTH] IN BLOOD BY AUTOMATED COUNT: 13.9 % (ref 12.3–15.4)
FLUAV SUBTYP SPEC NAA+PROBE: NOT DETECTED
FLUBV RNA ISLT QL NAA+PROBE: NOT DETECTED
GFR SERPL CREATININE-BSD FRML MDRD: 31 ML/MIN/1.73
GLOBULIN UR ELPH-MCNC: 2.1 GM/DL
GLUCOSE SERPL-MCNC: 92 MG/DL (ref 65–99)
GLUCOSE UR STRIP-MCNC: NEGATIVE MG/DL
HCT VFR BLD AUTO: 36.2 % (ref 37.5–51)
HGB BLD-MCNC: 12.1 G/DL (ref 13–17.7)
HGB UR QL STRIP.AUTO: NEGATIVE
HOLD SPECIMEN: NORMAL
HOLD SPECIMEN: NORMAL
IMM GRANULOCYTES # BLD AUTO: 0.01 10*3/MM3 (ref 0–0.05)
IMM GRANULOCYTES NFR BLD AUTO: 0.2 % (ref 0–0.5)
KETONES UR QL STRIP: NEGATIVE
LEUKOCYTE ESTERASE UR QL STRIP.AUTO: NEGATIVE
LYMPHOCYTES # BLD AUTO: 0.49 10*3/MM3 (ref 0.7–3.1)
LYMPHOCYTES NFR BLD AUTO: 10.9 % (ref 19.6–45.3)
MAGNESIUM SERPL-MCNC: 2.1 MG/DL (ref 1.7–2.3)
MCH RBC QN AUTO: 30.8 PG (ref 26.6–33)
MCHC RBC AUTO-ENTMCNC: 33.4 G/DL (ref 31.5–35.7)
MCV RBC AUTO: 92.1 FL (ref 79–97)
MONOCYTES # BLD AUTO: 0.56 10*3/MM3 (ref 0.1–0.9)
MONOCYTES NFR BLD AUTO: 12.5 % (ref 5–12)
NEUTROPHILS NFR BLD AUTO: 3.41 10*3/MM3 (ref 1.7–7)
NEUTROPHILS NFR BLD AUTO: 76 % (ref 42.7–76)
NITRITE UR QL STRIP: NEGATIVE
NRBC BLD AUTO-RTO: 0 /100 WBC (ref 0–0.2)
PH UR STRIP.AUTO: <=5 [PH] (ref 5–9)
PLATELET # BLD AUTO: 119 10*3/MM3 (ref 140–450)
PMV BLD AUTO: 10.6 FL (ref 6–12)
POTASSIUM SERPL-SCNC: 4.9 MMOL/L (ref 3.5–5.2)
PROT SERPL-MCNC: 6.1 G/DL (ref 6–8.5)
PROT UR QL STRIP: ABNORMAL
RBC # BLD AUTO: 3.93 10*6/MM3 (ref 4.14–5.8)
RBC MORPH BLD: NORMAL
SARS-COV-2 RNA PNL SPEC NAA+PROBE: DETECTED
SMALL PLATELETS BLD QL SMEAR: NORMAL
SODIUM SERPL-SCNC: 135 MMOL/L (ref 136–145)
SP GR UR STRIP: 1.01 (ref 1–1.03)
UROBILINOGEN UR QL STRIP: ABNORMAL
WBC MORPH BLD: NORMAL
WBC NRBC COR # BLD: 4.49 10*3/MM3 (ref 3.4–10.8)

## 2022-01-18 PROCEDURE — 85007 BL SMEAR W/DIFF WBC COUNT: CPT | Performed by: FAMILY MEDICINE

## 2022-01-18 PROCEDURE — 83735 ASSAY OF MAGNESIUM: CPT | Performed by: FAMILY MEDICINE

## 2022-01-18 PROCEDURE — 81003 URINALYSIS AUTO W/O SCOPE: CPT | Performed by: FAMILY MEDICINE

## 2022-01-18 PROCEDURE — 85025 COMPLETE CBC W/AUTO DIFF WBC: CPT | Performed by: FAMILY MEDICINE

## 2022-01-18 PROCEDURE — 36415 COLL VENOUS BLD VENIPUNCTURE: CPT

## 2022-01-18 PROCEDURE — 99284 EMERGENCY DEPT VISIT MOD MDM: CPT

## 2022-01-18 PROCEDURE — 82550 ASSAY OF CK (CPK): CPT | Performed by: FAMILY MEDICINE

## 2022-01-18 PROCEDURE — 70450 CT HEAD/BRAIN W/O DYE: CPT

## 2022-01-18 PROCEDURE — 71045 X-RAY EXAM CHEST 1 VIEW: CPT

## 2022-01-18 PROCEDURE — 80053 COMPREHEN METABOLIC PANEL: CPT | Performed by: FAMILY MEDICINE

## 2022-01-18 PROCEDURE — 87636 SARSCOV2 & INF A&B AMP PRB: CPT | Performed by: FAMILY MEDICINE

## 2022-01-18 RX ORDER — DIAPER,BRIEF,INFANT-TODD,DISP
1 EACH MISCELLANEOUS ONCE
Status: COMPLETED | OUTPATIENT
Start: 2022-01-18 | End: 2022-01-18

## 2022-01-18 RX ORDER — ONDANSETRON 4 MG/1
4 TABLET, ORALLY DISINTEGRATING ORAL EVERY 6 HOURS PRN
Qty: 10 TABLET | Refills: 0 | Status: SHIPPED | OUTPATIENT
Start: 2022-01-18 | End: 2022-02-20 | Stop reason: HOSPADM

## 2022-01-18 RX ORDER — GUAIFENESIN, PSEUDOEPHEDRINE HYDROCHLORIDE 600; 60 MG/1; MG/1
1 TABLET, EXTENDED RELEASE ORAL EVERY 12 HOURS
Qty: 10 TABLET | Refills: 0 | Status: SHIPPED | OUTPATIENT
Start: 2022-01-18 | End: 2022-02-20 | Stop reason: HOSPADM

## 2022-01-18 RX ADMIN — BACITRACIN 1 APPLICATION: 500 OINTMENT TOPICAL at 23:01

## 2022-01-19 NOTE — ED NOTES
Had a note left for me to contact daughter for hlp in placing this patient for rehab. I spoke with Mrs Breaux on the phone.  She stated her father lives at [facility] and needs to go short term to rehab and then hopefully will be able to return to [facility].  She asked for referral be sent to [facility].  Stated her mother was there at one time and she is familiar with them.  I called and spoke with Jennifer.  I sent a referral to the in basket.  She will review and get back with us.  Daughter aware.

## 2022-01-19 NOTE — ED NOTES
Pt's daughter had exited room due to pt being covid +. Pt up, out of bed attempting to leave. Gait unsteady. Pulled off cardiac monitor/leads/BP cuff. Assisted back to bed. Discussed with clinical leader, Rosie, and Dr. Felix. Daughter to remain at bedside at this time for pt safety.     Kirsten Gallegos RN  01/18/22 2029

## 2022-01-19 NOTE — DISCHARGE PLACEMENT REQUEST
"Eugene Velez (89 y.o. Male)             Date of Birth Social Security Number Address Home Phone MRN    03/22/1926  [id number]  [facility] [address] [phone] [id number]    Confucianism Marital Status             Jain        Admission Date Admission Type Admitting Provider Attending Provider Department, Room/Bed    1/18/22 Emergency   [facility] EMERGENCY DEPARTMENT, 03/03    Discharge Date Discharge Disposition Discharge Destination          1/18/2022 Home or Self Care              Attending Provider: (none)   Allergies: No Known Allergies    Isolation: Enh Drop/Con   Infection: COVID (confirmed) (01/18/22)   Code Status: Not on file   Advance Care Planning Activity    Ht: 175.3 cm (69\")   Wt: 69.3 kg (152 lb 12.8 oz)    Admission Cmt: None   Principal Problem: None                Active Insurance as of 1/18/2022     Primary Coverage     Payor Plan Insurance Group Employer/Plan Group    MEDICARE MEDICARE A & B      Payor Plan Address Payor Plan Phone Number Payor Plan Fax Number Effective Dates    [address] [phone]  3/1/1991 - None Entered    [facility] [address]       Subscriber Name Subscriber Birth Date Member ID       EUGENE VELEZ 3/22/1926 [id number]           Secondary Coverage     Payor Plan Insurance Group Employer/Plan Group    HUMANA HUMANA [id number]     Payor Plan Address Payor Plan Phone Number Payor Plan Fax Number Effective Dates    [address] [address] [phone]  1/1/2013 - None Entered    [facility] [address]       Subscriber Name Subscriber Birth Date Member ID       EUGENE VELEZ 3/22/1926 [id number]           Tertiary Coverage     Payor Plan Insurance Group Employer/Plan Group    [employer] [id number]     Payor Plan Address Payor Plan Phone Number Payor Plan Fax Number Effective Dates    [facility] OFFICE OF [facility] CARE [phone]  1/1/2019 - None Entered    [address] [address]       [facility] [address]       Subscriber " Name Subscriber Birth Date Member ID       EUGENE DÍAZ 3/22/1926 [id number]                 Emergency Contacts      (Rel.) Home Phone Work Phone Mobile Phone    Bhavani Breaux (Daughter) [phone] -- [phone]            Emergency Contact Information     Name Relation Home Work Mobile    Bhavani Breaux Daughter [phone]             Emergency Department Notes      Paul Felix MD at 01/18/22 1920          Subjective   Patient had a ground-level fall yesterday at the [facility] living [place].  At that time he was seen at [facility] and discharged home after some negative x-rays.  His daughter presents to the emergency department with the patient and states that he has been having difficulty walking, diarrhea, confusion, and his fatigue has worsened.        Cough  Cough characteristics:  Unable to specify  Sputum characteristics:  Nondescript  Severity:  Mild  Timing:  Intermittent  Progression:  Waxing and waning  Chronicity:  New  Associated symptoms: shortness of breath    Associated symptoms: no chest pain, no chills, no diaphoresis, no ear pain, no eye discharge, no fever, no headaches, no myalgias, no rash, no rhinorrhea, no sore throat and no wheezing    Altered Mental Status  Presenting symptoms: no confusion    Associated symptoms: weakness    Associated symptoms: no abdominal pain, no fever, no headaches, no light-headedness, no nausea, no rash, no seizures and no vomiting    Weakness - Generalized  Associated symptoms: cough, diarrhea, dizziness and shortness of breath    Associated symptoms: no abdominal pain, no chest pain, no dysuria, no fever, no frequency, no headaches, no myalgias, no nausea, no seizures, no urgency and no vomiting        Review of Systems   Constitutional: Positive for activity change. Negative for appetite change, chills, diaphoresis, fatigue and fever.   HENT: Positive for congestion. Negative for ear discharge, ear pain,  nosebleeds, rhinorrhea, sinus pressure, sore throat and trouble swallowing.    Eyes: Negative for discharge and redness.   Respiratory: Positive for cough and shortness of breath. Negative for apnea, chest tightness and wheezing.    Cardiovascular: Negative for chest pain.   Gastrointestinal: Positive for diarrhea. Negative for abdominal pain, nausea and vomiting.   Endocrine: Negative for polyuria.   Genitourinary: Negative for dysuria, frequency and urgency.   Musculoskeletal: Negative for myalgias and neck pain.   Skin: Negative for color change and rash.   Allergic/Immunologic: Negative for immunocompromised state.   Neurological: Positive for dizziness and weakness. Negative for seizures, syncope, light-headedness and headaches.   Hematological: Negative for adenopathy. Does not bruise/bleed easily.   Psychiatric/Behavioral: Negative for behavioral problems and confusion.   All other systems reviewed and are negative.      Past Medical History:   Diagnosis Date   • Bradycardia    • Chronic renal failure, stage 3 (moderate) (HCC)    • Depression    • Hypertension    • Hypothyroidism    • Paroxysmal atrial fibrillation (HCC)    • Pneumonia        No Known Allergies    Past Surgical History:   Procedure Laterality Date   • APPENDECTOMY     • BACK SURGERY     • CARDIAC ELECTROPHYSIOLOGY PROCEDURE N/A 11/19/2020    Procedure: PPM generator change - dual;  Surgeon: Marcos Barba MD;  Location: [facility] INVASIVE LOCATION;  Service: Cardiology;  Laterality: N/A;   • CATARACT EXTRACTION, BILATERAL     • INSERT / REPLACE / REMOVE PACEMAKER     • PACEMAKER IMPLANTATION     • PROSTATE SURGERY     • PROSTATE SURGERY         Family History   Problem Relation Age of Onset   • No Known Problems Mother    • No Known Problems Father        Social History     Socioeconomic History   • Marital status:    Tobacco Use   • Smoking status: Former Smoker   • Smokeless tobacco: Never Used   Substance and Sexual Activity   •  Alcohol use: No   • Drug use: No   • Sexual activity: Defer           Objective   Physical Exam  Vitals and nursing note reviewed.   Constitutional:       Appearance: He is well-developed.   HENT:      Head: Normocephalic and atraumatic.      Nose: Nose normal.   Eyes:      General: No scleral icterus.        Right eye: No discharge.         Left eye: No discharge.      Conjunctiva/sclera: Conjunctivae normal.      Pupils: Pupils are equal, round, and reactive to light.   Neck:      Trachea: No tracheal deviation.   Cardiovascular:      Rate and Rhythm: Normal rate and regular rhythm.      Heart sounds: Normal heart sounds. No murmur heard.      Pulmonary:      Effort: Pulmonary effort is normal. No respiratory distress.      Breath sounds: Normal breath sounds. No stridor. No wheezing or rales.   Abdominal:      General: Bowel sounds are normal. There is no distension.      Palpations: Abdomen is soft. There is no mass.      Tenderness: There is no abdominal tenderness. There is no guarding or rebound.   Musculoskeletal:      Cervical back: Normal range of motion and neck supple.      Comments: Patient is able to perform leg raises without any difficulty.  There is a superficial skin tear roughly 2 x 3 cm over the lateral right elbow, that is dressed.  No significant erythema, warmth, or drainage.   Skin:     General: Skin is warm and dry.      Findings: No erythema or rash.   Neurological:      Mental Status: He is alert and oriented to person, place, and time.      Coordination: Coordination normal.   Psychiatric:         Behavior: Behavior normal.         Thought Content: Thought content normal.         Procedures          ED Course  ED Course as of 01/18/22 2247 Tue Jan 18, 2022 2235 Findings were discussed with the patient's daughter.  Patient does not meet any type of inpatient criteria, but he is a fall risk.  The patient's daughter, Bhavani Breaux, states that she can stay the night with the patient at  the [facility] living [facility].  She was advised to call the emergency department tomorrow to speak with Mae Gaitan, case management, in regards to making plans for skilled nursing facility placement, possibly for a rehabilitation stay. [CB]      ED Course User Index  [CB] Paul Felix MD              Labs Reviewed   COVID-19 AND FLU A/B, NP SWAB IN TRANSPORT MEDIA 8-12 HR TAT - Abnormal; Notable for the following components:       Result Value    COVID19 Detected (*)     All other components within normal limits    Narrative:     Fact sheet for providers: https://www.fda.gov/media/505830/download    Fact sheet for patients: https://www.fda.gov/media/563803/download    Test performed by PCR.  Influenza A and Influenza B negative results should be considered presumptive in samples that have a positive SARS-CoV-2 result.    Competitive inhibition studies showed that SARS-CoV-2 virus, when present at concentrations above 3.6E+04 copies/mL, can inhibit the detection and amplification of influenza A and influenza B virus RNA if present at or below 1.8E+02 copies/mL or 4.9E+02 copies/mL, respectively, and may lead to false negative influenza virus results. If co-infection with influenza A or influenza B virus is suspected in samples with a positive SARS-CoV-2 result, the sample should be re-tested with another FDA cleared, approved, or authorized influenza test, if influenza virus detection would change clinical management.   COMPREHENSIVE METABOLIC PANEL - Abnormal; Notable for the following components:    BUN 34 (*)     Creatinine 2.02 (*)     Sodium 135 (*)     AST (SGOT) 42 (*)     eGFR Non  Amer 31 (*)     All other components within normal limits    Narrative:     GFR Normal >60  Chronic Kidney Disease <60  Kidney Failure <15     CBC WITH AUTO DIFFERENTIAL - Abnormal; Notable for the following components:    RBC 3.93 (*)     Hemoglobin 12.1 (*)     Hematocrit 36.2 (*)     Platelets 119 (*)      Lymphocyte % 10.9 (*)     Monocyte % 12.5 (*)     Eosinophil % 0.0 (*)     Lymphocytes, Absolute 0.49 (*)     All other components within normal limits   URINALYSIS W/ CULTURE IF INDICATED - Abnormal; Notable for the following components:    Protein, UA Trace (*)     All other components within normal limits    Narrative:     Urine microscopic not indicated.   CK - Normal   MAGNESIUM - Normal   SCAN SLIDE   RAINBOW DRAW    Narrative:     The following orders were created for panel order [place] Draw.  Procedure                               Abnormality         Status                     ---------                               -----------         ------                     Green Top (Gel)[[id number]]                                                             Lavender Top[[id number]]                                                                Red Top[[id number]]                                                                     Gold Top - SST[[id number]]                                   Final result               Green Top (No Gel)[[id number]]                                                          Light Blue Top[[id number]]                                                                Please view results for these tests on the individual orders.   RAINBOW DRAW    Narrative:     The following orders were created for panel order [place] Draw.  Procedure                               Abnormality         Status                     ---------                               -----------         ------                     Gold Top - SST[[id number]]                                                              Green Top (No Gel)[[id number]]                                                          Gray Top[[id number]]                                         In process                 Light Blue Top[[id number]]                                                                Please view results for these tests on the individual  orders.   CBC AND DIFFERENTIAL    Narrative:     The following orders were created for panel order CBC & Differential.  Procedure                               Abnormality         Status                     ---------                               -----------         ------                     CBC Auto Differential[[id number]]        Abnormal            Final result               Scan Slide[[id number]]                                       Final result                 Please view results for these tests on the individual orders.   GOLD TOP - SST   GREEN TOP   LAVENDER TOP   RED TOP   GREEN TOP NO GEL   LIGHT BLUE TOP   GOLD TOP - SST   GREEN TOP NO GEL   GRAY TOP   LIGHT BLUE TOP       CT Head Without Contrast   Final Result      No acute intracranial abnormality identified.      Electronically signed by:  Dannie Montez MD, LOI  1/18/2022   8:08 PM CST Workstation: [id number]      XR Chest 1 View   Final Result      No radiographic evidence of acute cardiopulmonary disease.      Electronically signed by:  Omid Varma MD  1/18/2022 8:01 PM CST   Workstation: [id number]                                                  [facility]    Final diagnoses:   COVID-19   Fatigue, unspecified type       ED Disposition  ED Disposition     ED Disposition Condition Comment    Discharge Stable           Adolfo Ackerman MD  [address] [place], [place] [id number] [phone]    In 2 days           Medication List      New Prescriptions    ondansetron ODT 4 MG disintegrating tablet  Commonly known as: ZOFRAN-ODT  Place 1 tablet on the tongue Every 6 (Six) Hours As Needed for Nausea or Vomiting.     pseudoephedrine-guaifenesin  MG per 12 hr tablet  Commonly known as: MUCINEX D  Take 1 tablet by mouth Every 12 (Twelve) Hours.           Where to Get Your Medications      These medications were sent to [facility]/pharmacy #[id number] - [place], [place] - [address] [facility] [phone] [facility] [phone]   [address],  [place] [address]    Phone: [phone]   · ondansetron ODT 4 MG disintegrating tablet  · pseudoephedrine-guaifenesin  MG per 12 hr tablet          Paul Felix MD  01/18/22 2236       Paul Felix MD  01/18/22 2247      Electronically signed by Paul Felix MD at 01/18/22 2247     Kirsten Gallegos RN at 01/18/22 1932        Patient is at an increased risk for falls. Fall light activated, yellow falls bracelet and yellow non-skid socks placed on patient. Call light within reach and patient instructed to call for assistance. Side rails up x2, bed alarm activated, and gait belt readily accessible.            Kirsten Gallegos RN  01/18/22 1932      Electronically signed by Kirsten Gallegos RN at 01/18/22 1932     Kirsten Gallegos RN at 01/18/22 1940        Pt stood to void with 2 asst. Generalized weakness noted.     Kirsten Gallegos RN  01/18/22 1940      Electronically signed by Kirsten Gallegos RN at 01/18/22 1940     Kirsten Gallegos RN at 01/18/22 2028        Pt's daughter had exited room due to pt being covid +. Pt up, out of bed attempting to leave. Gait unsteady. Pulled off cardiac monitor/leads/BP cuff. Assisted back to bed. Discussed with clinical leader, Rosie, and Dr. Felix. Daughter to remain at bedside at this time for pt safety.     Kirsten Gallegos RN  01/18/22 2029      Electronically signed by Kirsten Gallegos RN at 01/18/22 2029     Kirsten Gallegos RN at 01/18/22 2231        Per Dr. Felix, plan is for pt to be dc back to [facility]. Daughter has agreed to stay with pt for safety. Plans for [facility] Case management to assist with SNF placement tomorrow. Called Constanza at [facility] to give nursing report and plans for care. Verbalized understanding. Message left for Mae with Case Management regarding plans.     Kirsten Gallegos RN  01/18/22 2232      Electronically signed by Kirsten Gallegos, RN at 01/18/22 2232      Kirsten Gallegos RN at 01/18/22 2301        Right elbow skin tear cleansed with sterile saline. Bacitracin applied. Dressed with telfa and secured with kerlix. Tolerated well.     Kirsten Gallegos RN  01/18/22 2302      Electronically signed by Kirsten Gallegos RN at 01/18/22 2302     Lay Hutchison RN at 01/18/22 2303        Had a note left for me to contact daughter for hlp in placing this patient for rehab. I spoke with Mrs Breaux on the phone.  She stated her father lives at [facility] and needs to go short term to rehab and then hopefully will be able to return to [facility].  She asked for referral be sent to [facility].  Stated her mother was there at one time and she is familiar with them.  I called and spoke with Jennifer.  I sent a referral to the in basket.  She will review and get back with us.  Daughter aware.    Electronically signed by Lay Hutchison RN at 01/19/22 8003

## 2022-01-19 NOTE — DISCHARGE INSTRUCTIONS
Call the emergency department tomorrow around noon and ask for Mae Hutchison from case management for assistance with nursing home placement.

## 2022-01-19 NOTE — DISCHARGE PLACEMENT REQUEST
"Eugene Velez (89 y.o. Male)             Date of Birth Social Security Number Address Home Phone MRN    03/22/1926  [id number]  [facility] [address] [phone] [id number]    Latter day Marital Status             Gnosticist        Admission Date Admission Type Admitting Provider Attending Provider Department, Room/Bed    1/18/22 Emergency   [facility] EMERGENCY DEPARTMENT, 03/03    Discharge Date Discharge Disposition Discharge Destination          1/18/2022 Home or Self Care              Attending Provider: (none)   Allergies: No Known Allergies    Isolation: Enh Drop/Con   Infection: COVID (confirmed) (01/18/22)   Code Status: Not on file   Advance Care Planning Activity    Ht: 175.3 cm (69\")   Wt: 69.3 kg (152 lb 12.8 oz)    Admission Cmt: None   Principal Problem: None                Active Insurance as of 1/18/2022     Primary Coverage     Payor Plan Insurance Group Employer/Plan Group    MEDICARE MEDICARE A & B      Payor Plan Address Payor Plan Phone Number Payor Plan Fax Number Effective Dates    [address] [phone]  3/1/1991 - None Entered    [facility] [address]       Subscriber Name Subscriber Birth Date Member ID       EUGENE VELEZ 3/22/1926 [id number]           Secondary Coverage     Payor Plan Insurance Group Employer/Plan Group    HUMANA HUMANA [id number]     Payor Plan Address Payor Plan Phone Number Payor Plan Fax Number Effective Dates    [address] [address] [phone]  1/1/2013 - None Entered    [facility] [address]       Subscriber Name Subscriber Birth Date Member ID       EUGENE VELEZ 3/22/1926 [id number]           Tertiary Coverage     Payor Plan Insurance Group Employer/Plan Group    [employer] [place] [id number]     Payor Plan Address Payor Plan Phone Number Payor Plan Fax Number Effective Dates    [facility] OFFICE OF [facility] CARE [phone]  1/1/2019 - None Entered    [address] [address]       [facility] [address]       Subscriber " Name Subscriber Birth Date Member ID       EUGENE DÍAZ 3/22/1926 [id number]                 Emergency Contacts      (Rel.) Home Phone Work Phone Mobile Phone    Bhavani Breaux (Daughter) [phone] -- [phone]            Insurance Information                MEDICARE/MEDICARE A & B Phone: [phone]    Subscriber: Eugene Díaz Subscriber#: [id number]    Group#: -- Precert#: --        HUMANA/HUMANA Phone: [phone]    Subscriber: Eugene Díaz Subscriber#: [id number]    Group#: [id number] Precert#: --        [employer] [employer] [facility] Phone: [phone]    Subscriber: Eugene Díaz Subscriber#: [id number]    Group#: [id number] Precert#: --             Emergency Department Notes      Paul Felix MD at 01/18/22 1920          Subjective   Patient had a ground-level fall yesterday at the [facility].  At that time he was seen at [facility] and discharged home after some negative x-rays.  His daughter presents to the emergency department with the patient and states that he has been having difficulty walking, diarrhea, confusion, and his fatigue has worsened.        Cough  Cough characteristics:  Unable to specify  Sputum characteristics:  Nondescript  Severity:  Mild  Timing:  Intermittent  Progression:  Waxing and waning  Chronicity:  New  Associated symptoms: shortness of breath    Associated symptoms: no chest pain, no chills, no diaphoresis, no ear pain, no eye discharge, no fever, no headaches, no myalgias, no rash, no rhinorrhea, no sore throat and no wheezing    Altered Mental Status  Presenting symptoms: no confusion    Associated symptoms: weakness    Associated symptoms: no abdominal pain, no fever, no headaches, no light-headedness, no nausea, no rash, no seizures and no vomiting    Weakness - Generalized  Associated symptoms: cough, diarrhea, dizziness and shortness of breath    Associated symptoms: no abdominal pain, no chest pain, no  dysuria, no fever, no frequency, no headaches, no myalgias, no nausea, no seizures, no urgency and no vomiting        Review of Systems   Constitutional: Positive for activity change. Negative for appetite change, chills, diaphoresis, fatigue and fever.   HENT: Positive for congestion. Negative for ear discharge, ear pain, nosebleeds, rhinorrhea, sinus pressure, sore throat and trouble swallowing.    Eyes: Negative for discharge and redness.   Respiratory: Positive for cough and shortness of breath. Negative for apnea, chest tightness and wheezing.    Cardiovascular: Negative for chest pain.   Gastrointestinal: Positive for diarrhea. Negative for abdominal pain, nausea and vomiting.   Endocrine: Negative for polyuria.   Genitourinary: Negative for dysuria, frequency and urgency.   Musculoskeletal: Negative for myalgias and neck pain.   Skin: Negative for color change and rash.   Allergic/Immunologic: Negative for immunocompromised state.   Neurological: Positive for dizziness and weakness. Negative for seizures, syncope, light-headedness and headaches.   Hematological: Negative for adenopathy. Does not bruise/bleed easily.   Psychiatric/Behavioral: Negative for behavioral problems and confusion.   All other systems reviewed and are negative.      Past Medical History:   Diagnosis Date   • Bradycardia    • Chronic renal failure, stage 3 (moderate) (HCC)    • Depression    • Hypertension    • Hypothyroidism    • Paroxysmal atrial fibrillation (HCC)    • Pneumonia        No Known Allergies    Past Surgical History:   Procedure Laterality Date   • APPENDECTOMY     • BACK SURGERY     • CARDIAC ELECTROPHYSIOLOGY PROCEDURE N/A 11/19/2020    Procedure: PPM generator change - dual;  Surgeon: Marcos Barba MD;  Location: [facility] INVASIVE LOCATION;  Service: Cardiology;  Laterality: N/A;   • CATARACT EXTRACTION, BILATERAL     • INSERT / REPLACE / REMOVE PACEMAKER     • PACEMAKER IMPLANTATION     • PROSTATE SURGERY     •  PROSTATE SURGERY         Family History   Problem Relation Age of Onset   • No Known Problems Mother    • No Known Problems Father        Social History     Socioeconomic History   • Marital status:    Tobacco Use   • Smoking status: Former Smoker   • Smokeless tobacco: Never Used   Substance and Sexual Activity   • Alcohol use: No   • Drug use: No   • Sexual activity: Defer           Objective   Physical Exam  Vitals and nursing note reviewed.   Constitutional:       Appearance: He is well-developed.   HENT:      Head: Normocephalic and atraumatic.      Nose: Nose normal.   Eyes:      General: No scleral icterus.        Right eye: No discharge.         Left eye: No discharge.      Conjunctiva/sclera: Conjunctivae normal.      Pupils: Pupils are equal, round, and reactive to light.   Neck:      Trachea: No tracheal deviation.   Cardiovascular:      Rate and Rhythm: Normal rate and regular rhythm.      Heart sounds: Normal heart sounds. No murmur heard.      Pulmonary:      Effort: Pulmonary effort is normal. No respiratory distress.      Breath sounds: Normal breath sounds. No stridor. No wheezing or rales.   Abdominal:      General: Bowel sounds are normal. There is no distension.      Palpations: Abdomen is soft. There is no mass.      Tenderness: There is no abdominal tenderness. There is no guarding or rebound.   Musculoskeletal:      Cervical back: Normal range of motion and neck supple.      Comments: Patient is able to perform leg raises without any difficulty.  There is a superficial skin tear roughly 2 x 3 cm over the lateral right elbow, that is dressed.  No significant erythema, warmth, or drainage.   Skin:     General: Skin is warm and dry.      Findings: No erythema or rash.   Neurological:      Mental Status: He is alert and oriented to person, place, and time.      Coordination: Coordination normal.   Psychiatric:         Behavior: Behavior normal.         Thought Content: Thought content  normal.         Procedures          ED Course  ED Course as of 01/18/22 2247 Tue Jan 18, 2022 2235 Findings were discussed with the patient's daughter.  Patient does not meet any type of inpatient criteria, but he is a fall risk.  The patient's daughter, Bhvaani Breaux, states that she can stay the night with the patient at the assisted living center [facility].  She was advised to call the emergency department tomorrow to speak with Mae Gaitan, case management, in regards to making plans for skilled nursing facility placement, possibly for a rehabilitation stay. [CB]      ED Course User Index  [CB] Paul Felix MD              Labs Reviewed   COVID-19 AND FLU A/B, NP SWAB IN TRANSPORT MEDIA 8-12 HR TAT - Abnormal; Notable for the following components:       Result Value    COVID19 Detected (*)     All other components within normal limits    Narrative:     Fact sheet for providers: https://www.fda.gov/media/674601/download    Fact sheet for patients: https://www.fda.gov/media/106450/download    Test performed by PCR.  Influenza A and Influenza B negative results should be considered presumptive in samples that have a positive SARS-CoV-2 result.    Competitive inhibition studies showed that SARS-CoV-2 virus, when present at concentrations above 3.6E+04 copies/mL, can inhibit the detection and amplification of influenza A and influenza B virus RNA if present at or below 1.8E+02 copies/mL or 4.9E+02 copies/mL, respectively, and may lead to false negative influenza virus results. If co-infection with influenza A or influenza B virus is suspected in samples with a positive SARS-CoV-2 result, the sample should be re-tested with another FDA cleared, approved, or authorized influenza test, if influenza virus detection would change clinical management.   COMPREHENSIVE METABOLIC PANEL - Abnormal; Notable for the following components:    BUN 34 (*)     Creatinine 2.02 (*)     Sodium 135 (*)     AST (SGOT) 42 (*)      eGFR Non  Amer 31 (*)     All other components within normal limits    Narrative:     GFR Normal >60  Chronic Kidney Disease <60  Kidney Failure <15     CBC WITH AUTO DIFFERENTIAL - Abnormal; Notable for the following components:    RBC 3.93 (*)     Hemoglobin 12.1 (*)     Hematocrit 36.2 (*)     Platelets 119 (*)     Lymphocyte % 10.9 (*)     Monocyte % 12.5 (*)     Eosinophil % 0.0 (*)     Lymphocytes, Absolute 0.49 (*)     All other components within normal limits   URINALYSIS W/ CULTURE IF INDICATED - Abnormal; Notable for the following components:    Protein, UA Trace (*)     All other components within normal limits    Narrative:     Urine microscopic not indicated.   CK - Normal   MAGNESIUM - Normal   SCAN SLIDE   RAINBOW DRAW    Narrative:     The following orders were created for panel order [place] Draw.  Procedure                               Abnormality         Status                     ---------                               -----------         ------                     Green Top (Gel)[[id number]]                                                             Lavender Top[[id number]]                                                                Red Top[[id number]]                                                                     Gold Top - SST[[id number]]                                   Final result               Green Top (No Gel)[[id number]]                                                          Light Blue Top[[id number]]                                                                Please view results for these tests on the individual orders.   RAINBOW DRAW    Narrative:     The following orders were created for panel order [place] Draw.  Procedure                               Abnormality         Status                     ---------                               -----------         ------                     Gold Top - SST[[id number]]                                                               Green Top (No Gel)[[id number]]                                                          Gray Top[[id number]]                                         In process                 Light Blue Top[[id number]]                                                                Please view results for these tests on the individual orders.   CBC AND DIFFERENTIAL    Narrative:     The following orders were created for panel order CBC & Differential.  Procedure                               Abnormality         Status                     ---------                               -----------         ------                     CBC Auto Differential[[id number]]        Abnormal            Final result               Scan Slide[[id number]]                                       Final result                 Please view results for these tests on the individual orders.   GOLD TOP - SST   GREEN TOP   LAVENDER TOP   RED TOP   GREEN TOP NO GEL   LIGHT BLUE TOP   GOLD TOP - SST   GREEN TOP NO GEL   GRAY TOP   LIGHT BLUE TOP       CT Head Without Contrast   Final Result      No acute intracranial abnormality identified.      Electronically signed by:  Dannie Montez MD, LOI  1/18/2022   8:08 PM CST Workstation: [id number]      XR Chest 1 View   Final Result      No radiographic evidence of acute cardiopulmonary disease.      Electronically signed by:  Omid Varma MD  1/18/2022 8:01 PM CST   Workstation: [id number]                                                  [facility]    Final diagnoses:   COVID-19   Fatigue, unspecified type       ED Disposition  ED Disposition     ED Disposition Condition Comment    Discharge Stable           Adolfo Ackerman MD  [address] [place] [address]  [phone]    In 2 days           Medication List      New Prescriptions    ondansetron ODT 4 MG disintegrating tablet  Commonly known as: ZOFRAN-ODT  Place 1 tablet on the tongue Every 6 (Six) Hours As Needed for Nausea or Vomiting.     pseudoephedrine-guaifenesin   MG per 12 hr tablet  Commonly known as: MUCINEX D  Take 1 tablet by mouth Every 12 (Twelve) Hours.           Where to Get Your Medications      These medications were sent to [facility]/pharmacy #[id number] - [place], [place] - [address]AR [facility] - [phone]  - [phone] FX  [address] [place] [address]    Phone: [phone]   · ondansetron ODT 4 MG disintegrating tablet  · pseudoephedrine-guaifenesin  MG per 12 hr tablet          Paul Felix MD  01/18/22 2236       Paul Felix MD  01/18/22 2247      Electronically signed by Paul Felix MD at 01/18/22 2247     Kirsten Gallegos RN at 01/18/22 1932        Patient is at an increased risk for falls. Fall light activated, yellow falls bracelet and yellow non-skid socks placed on patient. Call light within reach and patient instructed to call for assistance. Side rails up x2, bed alarm activated, and gait belt readily accessible.            Kirsten Gallegos RN  01/18/22 1932      Electronically signed by Kirsten Gallegos RN at 01/18/22 1932     Kirsten Gallegos RN at 01/18/22 1940        Pt stood to void with 2 asst. Generalized weakness noted.     Kirsten Gallegos RN  01/18/22 1940      Electronically signed by Kirsten Gallegos RN at 01/18/22 1940     Kirsten Gallegos RN at 01/18/22 2028        Pt's daughter had exited room due to pt being covid +. Pt up, out of bed attempting to leave. Gait unsteady. Pulled off cardiac monitor/leads/BP cuff. Assisted back to bed. Discussed with clinical leader, Rosie, and Dr. Felix. Daughter to remain at bedside at this time for pt safety.     Kirsten Gallegos RN  01/18/22 2029      Electronically signed by Kirsten Gallegos RN at 01/18/22 2029     Kirsten Gallegos RN at 01/18/22 1342        Per Dr. Felix, plan is for pt to be dc back to [facility]. Daughter has agreed to stay with pt for safety. Plans for [facility] Case  management to assist with SNF placement tomorrow. Called Constanza at [facility] to give nursing report and plans for care. Verbalized understanding. Message left for Mae with Case Management regarding plans.     Kirsten Gallegos, KRISTIN  01/18/22 2232      Electronically signed by Kirsten Gallegos, RN at 01/18/22 2232     Kirsten Gallegos, RN at 01/18/22 2301        Right elbow skin tear cleansed with sterile saline. Bacitracin applied. Dressed with telfa and secured with kerlix. Tolerated well.     Kirsten Gallegos RN  01/18/22 2302      Electronically signed by Kirsten Gallegos, RN at 01/18/22 2302

## 2022-01-19 NOTE — ED NOTES
Right elbow skin tear cleansed with sterile saline. Bacitracin applied. Dressed with telfa and secured with kerlix. Tolerated well.     Kirsten Gallegos RN  01/18/22 7698

## 2022-01-19 NOTE — ED NOTES
Per Dr. Felix, plan is for pt to be dc back to [facility]. Daughter has agreed to stay with pt for safety. Plans for [facility] Case management to assist with SNF placement tomorrow. Called Constanza at [facility] to give nursing report and plans for care. Verbalized understanding. Message left for Mae with Case Management regarding plans.     Kirsten Gallegos RN  01/18/22 7784

## 2022-01-19 NOTE — ED NOTES
Patient is at an increased risk for falls. Fall light activated, yellow falls bracelet and yellow non-skid socks placed on patient. Call light within reach and patient instructed to call for assistance. Side rails up x2, bed alarm activated, and gait belt readily accessible.            Kirsten Gallegos, RN  01/18/22 6018

## 2022-01-19 NOTE — ED NOTES
Pt stood to void with 2 asst. Generalized weakness noted.     Kirsten Gallegos, RN  01/18/22 1940

## 2022-01-19 NOTE — ED PROVIDER NOTES
Subjective   Patient had a ground-level fall yesterday at the [facility] living [place].  At that time he was seen at [facility] and discharged home after some negative x-rays.  His daughter presents to the emergency department with the patient and states that he has been having difficulty walking, diarrhea, confusion, and his fatigue has worsened.        Cough  Cough characteristics:  Unable to specify  Sputum characteristics:  Nondescript  Severity:  Mild  Timing:  Intermittent  Progression:  Waxing and waning  Chronicity:  New  Associated symptoms: shortness of breath    Associated symptoms: no chest pain, no chills, no diaphoresis, no ear pain, no eye discharge, no fever, no headaches, no myalgias, no rash, no rhinorrhea, no sore throat and no wheezing    Altered Mental Status  Presenting symptoms: no confusion    Associated symptoms: weakness    Associated symptoms: no abdominal pain, no fever, no headaches, no light-headedness, no nausea, no rash, no seizures and no vomiting    Weakness - Generalized  Associated symptoms: cough, diarrhea, dizziness and shortness of breath    Associated symptoms: no abdominal pain, no chest pain, no dysuria, no fever, no frequency, no headaches, no myalgias, no nausea, no seizures, no urgency and no vomiting        Review of Systems   Constitutional: Positive for activity change. Negative for appetite change, chills, diaphoresis, fatigue and fever.   HENT: Positive for congestion. Negative for ear discharge, ear pain, nosebleeds, rhinorrhea, sinus pressure, sore throat and trouble swallowing.    Eyes: Negative for discharge and redness.   Respiratory: Positive for cough and shortness of breath. Negative for apnea, chest tightness and wheezing.    Cardiovascular: Negative for chest pain.   Gastrointestinal: Positive for diarrhea. Negative for abdominal pain, nausea and vomiting.   Endocrine: Negative for polyuria.   Genitourinary: Negative for dysuria, frequency and urgency.    Musculoskeletal: Negative for myalgias and neck pain.   Skin: Negative for color change and rash.   Allergic/Immunologic: Negative for immunocompromised state.   Neurological: Positive for dizziness and weakness. Negative for seizures, syncope, light-headedness and headaches.   Hematological: Negative for adenopathy. Does not bruise/bleed easily.   Psychiatric/Behavioral: Negative for behavioral problems and confusion.   All other systems reviewed and are negative.      Past Medical History:   Diagnosis Date   • Bradycardia    • Chronic renal failure, stage 3 (moderate) (HCC)    • Depression    • Hypertension    • Hypothyroidism    • Paroxysmal atrial fibrillation (HCC)    • Pneumonia        No Known Allergies    Past Surgical History:   Procedure Laterality Date   • APPENDECTOMY     • BACK SURGERY     • CARDIAC ELECTROPHYSIOLOGY PROCEDURE N/A 11/19/2020    Procedure: PPM generator change - dual;  Surgeon: Marcos Barba MD;  Location: [facility] INVASIVE LOCATION;  Service: Cardiology;  Laterality: N/A;   • CATARACT EXTRACTION, BILATERAL     • INSERT / REPLACE / REMOVE PACEMAKER     • PACEMAKER IMPLANTATION     • PROSTATE SURGERY     • PROSTATE SURGERY         Family History   Problem Relation Age of Onset   • No Known Problems Mother    • No Known Problems Father        Social History     Socioeconomic History   • Marital status:    Tobacco Use   • Smoking status: Former Smoker   • Smokeless tobacco: Never Used   Substance and Sexual Activity   • Alcohol use: No   • Drug use: No   • Sexual activity: Defer           Objective   Physical Exam  Vitals and nursing note reviewed.   Constitutional:       Appearance: He is well-developed.   HENT:      Head: Normocephalic and atraumatic.      Nose: Nose normal.   Eyes:      General: No scleral icterus.        Right eye: No discharge.         Left eye: No discharge.      Conjunctiva/sclera: Conjunctivae normal.      Pupils: Pupils are equal, round, and reactive  to light.   Neck:      Trachea: No tracheal deviation.   Cardiovascular:      Rate and Rhythm: Normal rate and regular rhythm.      Heart sounds: Normal heart sounds. No murmur heard.      Pulmonary:      Effort: Pulmonary effort is normal. No respiratory distress.      Breath sounds: Normal breath sounds. No stridor. No wheezing or rales.   Abdominal:      General: Bowel sounds are normal. There is no distension.      Palpations: Abdomen is soft. There is no mass.      Tenderness: There is no abdominal tenderness. There is no guarding or rebound.   Musculoskeletal:      Cervical back: Normal range of motion and neck supple.      Comments: Patient is able to perform leg raises without any difficulty.  There is a superficial skin tear roughly 2 x 3 cm over the lateral right elbow, that is dressed.  No significant erythema, warmth, or drainage.   Skin:     General: Skin is warm and dry.      Findings: No erythema or rash.   Neurological:      Mental Status: He is alert and oriented to person, place, and time.      Coordination: Coordination normal.   Psychiatric:         Behavior: Behavior normal.         Thought Content: Thought content normal.         Procedures           ED Course  ED Course as of 01/18/22 2247   Tue Jan 18, 2022 2235 Findings were discussed with the patient's daughter.  Patient does not meet any type of inpatient criteria, but he is a fall risk.  The patient's daughter, Bhavani Breaux, states that she can stay the night with the patient at the assisted living center [facility].  She was advised to call the emergency department tomorrow to speak with Mae Gaitan, case management, in regards to making plans for skilled nursing facility placement, possibly for a rehabilitation stay. [CB]      ED Course User Index  [CB] Paul Felix MD              Labs Reviewed   COVID-19 AND FLU A/B, NP SWAB IN TRANSPORT MEDIA 8-12 HR TAT - Abnormal; Notable for the following components:       Result Value     COVID19 Detected (*)     All other components within normal limits    Narrative:     Fact sheet for providers: https://www.fda.gov/media/802259/download    Fact sheet for patients: https://www.fda.gov/media/712458/download    Test performed by PCR.  Influenza A and Influenza B negative results should be considered presumptive in samples that have a positive SARS-CoV-2 result.    Competitive inhibition studies showed that SARS-CoV-2 virus, when present at concentrations above 3.6E+04 copies/mL, can inhibit the detection and amplification of influenza A and influenza B virus RNA if present at or below 1.8E+02 copies/mL or 4.9E+02 copies/mL, respectively, and may lead to false negative influenza virus results. If co-infection with influenza A or influenza B virus is suspected in samples with a positive SARS-CoV-2 result, the sample should be re-tested with another FDA cleared, approved, or authorized influenza test, if influenza virus detection would change clinical management.   COMPREHENSIVE METABOLIC PANEL - Abnormal; Notable for the following components:    BUN 34 (*)     Creatinine 2.02 (*)     Sodium 135 (*)     AST (SGOT) 42 (*)     eGFR Non  Amer 31 (*)     All other components within normal limits    Narrative:     GFR Normal >60  Chronic Kidney Disease <60  Kidney Failure <15     CBC WITH AUTO DIFFERENTIAL - Abnormal; Notable for the following components:    RBC 3.93 (*)     Hemoglobin 12.1 (*)     Hematocrit 36.2 (*)     Platelets 119 (*)     Lymphocyte % 10.9 (*)     Monocyte % 12.5 (*)     Eosinophil % 0.0 (*)     Lymphocytes, Absolute 0.49 (*)     All other components within normal limits   URINALYSIS W/ CULTURE IF INDICATED - Abnormal; Notable for the following components:    Protein, UA Trace (*)     All other components within normal limits    Narrative:     Urine microscopic not indicated.   CK - Normal   MAGNESIUM - Normal   SCAN SLIDE   RAINBOW DRAW    Narrative:     The following orders  were created for panel order [place] Draw.  Procedure                               Abnormality         Status                     ---------                               -----------         ------                     Green Top (Gel)[[id number]]                                                             Lavender Top[[id number]]                                                                Red Top[[id number]]                                                                     Gold Top - SST[[id number]]                                   Final result               Green Top (No Gel)[[id number]]                                                          Light Blue Top[[id number]]                                                                Please view results for these tests on the individual orders.   RAINBOW DRAW    Narrative:     The following orders were created for panel order [place] Draw.  Procedure                               Abnormality         Status                     ---------                               -----------         ------                     Gold Top - SST[[id number]]                                                              Green Top (No Gel)[[id number]]                                                          Gray Top[[id number]]                                         In process                 Light Blue Top[[id number]]                                                                Please view results for these tests on the individual orders.   CBC AND DIFFERENTIAL    Narrative:     The following orders were created for panel order CBC & Differential.  Procedure                               Abnormality         Status                     ---------                               -----------         ------                     CBC Auto Differential[[id number]]        Abnormal            Final result               Scan Slide[[id number]]                                       Final result                  Please view results for these tests on the individual orders.   GOLD TOP - SST   GREEN TOP   LAVENDER TOP   RED TOP   GREEN TOP NO GEL   LIGHT BLUE TOP   GOLD TOP - SST   GREEN TOP NO GEL   GRAY TOP   LIGHT BLUE TOP       CT Head Without Contrast   Final Result      No acute intracranial abnormality identified.      Electronically signed by:  Dannie Montez MD, LOI  1/18/2022   8:08 PM CST Workstation: [id number]      XR Chest 1 View   Final Result      No radiographic evidence of acute cardiopulmonary disease.      Electronically signed by:  Omid Varma MD  1/18/2022 8:01 PM CST   Workstation: [id number]                                                  [facility]    Final diagnoses:   COVID-19   Fatigue, unspecified type       ED Disposition  ED Disposition     ED Disposition Condition Comment    Discharge Stable           Adolfo Ackerman MD  [address] [id number] [phone]    In 2 days           Medication List      New Prescriptions    ondansetron ODT 4 MG disintegrating tablet  Commonly known as: ZOFRAN-ODT  Place 1 tablet on the tongue Every 6 (Six) Hours As Needed for Nausea or Vomiting.     pseudoephedrine-guaifenesin  MG per 12 hr tablet  Commonly known as: MUCINEX D  Take 1 tablet by mouth Every 12 (Twelve) Hours.           Where to Get Your Medications      These medications were sent to [facility]/pharmacy #[id number] - [place] - [address] [facility] [phone] [facility] [phone]   [address], [facility] [address]    Phone: [phone]   · ondansetron ODT 4 MG disintegrating tablet  · pseudoephedrine-guaifenesin  MG per 12 hr tablet          Paul Felix MD  01/18/22 2236       Paul Felix MD  01/18/22 224

## 2022-01-20 NOTE — ED NOTES
Spoke with patient daughter Bhavani.  Patient has been accepted at [facility] and rehab.  She is taking him to the facility today.

## 2022-02-10 ENCOUNTER — TELEPHONE (OUTPATIENT)
Dept: CARDIOLOGY | Facility: CLINIC | Age: 87
End: 2022-02-10

## 2022-02-10 ENCOUNTER — CLINICAL SUPPORT (OUTPATIENT)
Dept: CARDIOLOGY | Facility: CLINIC | Age: 87
End: 2022-02-10

## 2022-02-10 DIAGNOSIS — Z95.0 PACEMAKER: ICD-10-CM

## 2022-02-10 DIAGNOSIS — I49.5 SSS (SICK SINUS SYNDROME): Primary | ICD-10-CM

## 2022-02-10 PROCEDURE — 93288 INTERROG EVL PM/LDLS PM IP: CPT | Performed by: NURSE PRACTITIONER

## 2022-02-10 NOTE — TELEPHONE ENCOUNTER
Daughter called requesting patient get his pacemaker interrogated at NH. Jose, with [facility] notified, states rep will be there today to interrogate pacemaker. NH, [place], notified that Audrey, with [facility], would be there today to interrogate.

## 2022-02-10 NOTE — PROGRESS NOTES
Pacemaker Evaluation Report    February 10, 2022    Primary Cardiologist: Dr. Barba  Implanting MD: Dr. Barba  :[employer] Model: Assurity MRI 2272 Pacemaker Serial Number: [id number]  Implant date: 11/19/20    Reason for evaluation: symptoms and Nursing Home  Cardiac device indication(s): sick sinus syndrome    Battery  EMETERIO: 8.4-10.3 years     Interrogation Results  Atrial sensing: P wave: 0.6 mV  Atrial capture: not performed   Atrial lead impedance: 390 ohms  Ventricular sensing: R wave: 9.5 mV  Ventricular capture: 0.5 V @ 0.5 ms   Ventricular lead impedance: 540 ohms    Parameters  Mode: DDDR  Base Rate: 60/120    Diagnostic Data  Atrial paced: 2.3 %   Ventricular paced: 69 %  Mode switch: 98%  AT/AF [place]: 98%  AHR: 0  VHR: 1    Intrinsic Rate: -    Changes made: none    Conclusions: He had + covid test 1/18/22. Since he has moved from Assisted living to SNF. His RVR appears to have been isolated from 1/21-1/26. Daughter says that patient is not doing well and considering end of life care.       Assessment:  1. SSS (sick sinus syndrome) ([facility])    2. Pacemaker            This document has been electronically signed by LYNDA Ponce on February 10, 2022 16:33 CST

## 2022-02-14 ENCOUNTER — APPOINTMENT (OUTPATIENT)
Dept: GENERAL RADIOLOGY | Facility: HOSPITAL | Age: 87
End: 2022-02-14

## 2022-02-14 ENCOUNTER — APPOINTMENT (OUTPATIENT)
Dept: CT IMAGING | Facility: HOSPITAL | Age: 87
End: 2022-02-14

## 2022-02-14 ENCOUNTER — HOSPITAL ENCOUNTER (INPATIENT)
Facility: HOSPITAL | Age: 87
LOS: 6 days | Discharge: HOSPICE | End: 2022-02-20
Attending: EMERGENCY MEDICINE | Admitting: INTERNAL MEDICINE

## 2022-02-14 DIAGNOSIS — Z51.5 HOSPICE CARE: ICD-10-CM

## 2022-02-14 DIAGNOSIS — R09.02 HYPOXIA: ICD-10-CM

## 2022-02-14 DIAGNOSIS — N17.9 AKI (ACUTE KIDNEY INJURY): ICD-10-CM

## 2022-02-14 DIAGNOSIS — N28.89 KIDNEY MASS: ICD-10-CM

## 2022-02-14 DIAGNOSIS — E86.0 DEHYDRATION: Primary | ICD-10-CM

## 2022-02-14 DIAGNOSIS — K80.20 CALCULUS OF GALLBLADDER WITHOUT CHOLECYSTITIS WITHOUT OBSTRUCTION: ICD-10-CM

## 2022-02-14 LAB
ALBUMIN SERPL-MCNC: 4.5 G/DL (ref 3.5–5.2)
ALBUMIN/GLOB SERPL: 1.3 G/DL
ALP SERPL-CCNC: 100 U/L (ref 39–117)
ALT SERPL W P-5'-P-CCNC: 20 U/L (ref 1–41)
ANION GAP SERPL CALCULATED.3IONS-SCNC: 21 MMOL/L (ref 5–15)
ARTERIAL PATENCY WRIST A: ABNORMAL
AST SERPL-CCNC: 31 U/L (ref 1–40)
ATMOSPHERIC PRESS: 756 MMHG
BASE EXCESS BLDA CALC-SCNC: -5.8 MMOL/L (ref 0–2)
BASOPHILS # BLD AUTO: 0.06 10*3/MM3 (ref 0–0.2)
BASOPHILS NFR BLD AUTO: 0.6 % (ref 0–1.5)
BDY SITE: ABNORMAL
BILIRUB SERPL-MCNC: 0.5 MG/DL (ref 0–1.2)
BUN SERPL-MCNC: 93 MG/DL (ref 8–23)
BUN/CREAT SERPL: 27.9 (ref 7–25)
CALCIUM SPEC-SCNC: 11.1 MG/DL (ref 8.2–9.6)
CHLORIDE SERPL-SCNC: 110 MMOL/L (ref 98–107)
CO2 SERPL-SCNC: 19 MMOL/L (ref 22–29)
CREAT SERPL-MCNC: 3.33 MG/DL (ref 0.76–1.27)
D-DIMER, QUANTITATIVE (MAD,POW, STR): 514 NG/ML (FEU) (ref 0–470)
D-LACTATE SERPL-SCNC: 3.5 MMOL/L (ref 0.5–2)
D-LACTATE SERPL-SCNC: 4.5 MMOL/L (ref 0.5–2)
DEPRECATED RDW RBC AUTO: 46.4 FL (ref 37–54)
EOSINOPHIL # BLD AUTO: 0.03 10*3/MM3 (ref 0–0.4)
EOSINOPHIL NFR BLD AUTO: 0.3 % (ref 0.3–6.2)
ERYTHROCYTE [DISTWIDTH] IN BLOOD BY AUTOMATED COUNT: 14.5 % (ref 12.3–15.4)
GFR SERPL CREATININE-BSD FRML MDRD: 17 ML/MIN/1.73
GLOBULIN UR ELPH-MCNC: 3.6 GM/DL
GLUCOSE SERPL-MCNC: 127 MG/DL (ref 65–99)
HCO3 BLDA-SCNC: 12.7 MMOL/L (ref 20–26)
HCT VFR BLD AUTO: 50.6 % (ref 37.5–51)
HGB BLD-MCNC: 16.9 G/DL (ref 13–17.7)
HOLD SPECIMEN: NORMAL
IMM GRANULOCYTES # BLD AUTO: 0.09 10*3/MM3 (ref 0–0.05)
IMM GRANULOCYTES NFR BLD AUTO: 0.9 % (ref 0–0.5)
LIPASE SERPL-CCNC: 296 U/L (ref 13–60)
LYMPHOCYTES # BLD AUTO: 0.77 10*3/MM3 (ref 0.7–3.1)
LYMPHOCYTES NFR BLD AUTO: 7.3 % (ref 19.6–45.3)
Lab: ABNORMAL
MAGNESIUM SERPL-MCNC: 3 MG/DL (ref 1.7–2.3)
MCH RBC QN AUTO: 29.9 PG (ref 26.6–33)
MCHC RBC AUTO-ENTMCNC: 33.4 G/DL (ref 31.5–35.7)
MCV RBC AUTO: 89.4 FL (ref 79–97)
MODALITY: ABNORMAL
MONOCYTES # BLD AUTO: 0.62 10*3/MM3 (ref 0.1–0.9)
MONOCYTES NFR BLD AUTO: 5.9 % (ref 5–12)
NEUTROPHILS NFR BLD AUTO: 8.91 10*3/MM3 (ref 1.7–7)
NEUTROPHILS NFR BLD AUTO: 85 % (ref 42.7–76)
NRBC BLD AUTO-RTO: 0 /100 WBC (ref 0–0.2)
NT-PROBNP SERPL-MCNC: 8394 PG/ML (ref 0–1800)
PCO2 BLDA: 14.6 MM HG (ref 35–45)
PH BLDA: 7.55 PH UNITS (ref 7.35–7.45)
PLATELET # BLD AUTO: 317 10*3/MM3 (ref 140–450)
PMV BLD AUTO: 11.3 FL (ref 6–12)
PO2 BLDA: 147 MM HG (ref 83–108)
POTASSIUM SERPL-SCNC: 5 MMOL/L (ref 3.5–5.2)
PROT SERPL-MCNC: 8.1 G/DL (ref 6–8.5)
RBC # BLD AUTO: 5.66 10*6/MM3 (ref 4.14–5.8)
SAO2 % BLDCOA: 99.7 % (ref 94–99)
SODIUM SERPL-SCNC: 150 MMOL/L (ref 136–145)
TROPONIN T SERPL-MCNC: 0.08 NG/ML (ref 0–0.03)
VENTILATOR MODE: ABNORMAL
WBC NRBC COR # BLD: 10.48 10*3/MM3 (ref 3.4–10.8)
WHOLE BLOOD HOLD SPECIMEN: NORMAL

## 2022-02-14 PROCEDURE — 83735 ASSAY OF MAGNESIUM: CPT | Performed by: EMERGENCY MEDICINE

## 2022-02-14 PROCEDURE — 84484 ASSAY OF TROPONIN QUANT: CPT | Performed by: EMERGENCY MEDICINE

## 2022-02-14 PROCEDURE — 87040 BLOOD CULTURE FOR BACTERIA: CPT | Performed by: EMERGENCY MEDICINE

## 2022-02-14 PROCEDURE — 25010000002 CEFTRIAXONE PER 250 MG: Performed by: EMERGENCY MEDICINE

## 2022-02-14 PROCEDURE — 71045 X-RAY EXAM CHEST 1 VIEW: CPT

## 2022-02-14 PROCEDURE — 83690 ASSAY OF LIPASE: CPT | Performed by: EMERGENCY MEDICINE

## 2022-02-14 PROCEDURE — 83605 ASSAY OF LACTIC ACID: CPT | Performed by: EMERGENCY MEDICINE

## 2022-02-14 PROCEDURE — 99285 EMERGENCY DEPT VISIT HI MDM: CPT

## 2022-02-14 PROCEDURE — 74176 CT ABD & PELVIS W/O CONTRAST: CPT

## 2022-02-14 PROCEDURE — 83880 ASSAY OF NATRIURETIC PEPTIDE: CPT | Performed by: EMERGENCY MEDICINE

## 2022-02-14 PROCEDURE — 93010 ELECTROCARDIOGRAM REPORT: CPT | Performed by: INTERNAL MEDICINE

## 2022-02-14 PROCEDURE — 80053 COMPREHEN METABOLIC PANEL: CPT | Performed by: EMERGENCY MEDICINE

## 2022-02-14 PROCEDURE — 93005 ELECTROCARDIOGRAM TRACING: CPT

## 2022-02-14 PROCEDURE — 82803 BLOOD GASES ANY COMBINATION: CPT

## 2022-02-14 PROCEDURE — 85379 FIBRIN DEGRADATION QUANT: CPT | Performed by: EMERGENCY MEDICINE

## 2022-02-14 PROCEDURE — 36415 COLL VENOUS BLD VENIPUNCTURE: CPT | Performed by: EMERGENCY MEDICINE

## 2022-02-14 PROCEDURE — 85025 COMPLETE CBC W/AUTO DIFF WBC: CPT | Performed by: EMERGENCY MEDICINE

## 2022-02-14 PROCEDURE — 36600 WITHDRAWAL OF ARTERIAL BLOOD: CPT

## 2022-02-14 RX ORDER — SODIUM CHLORIDE 450 MG/100ML
100 INJECTION, SOLUTION INTRAVENOUS CONTINUOUS
Status: DISCONTINUED | OUTPATIENT
Start: 2022-02-15 | End: 2022-02-20 | Stop reason: HOSPADM

## 2022-02-14 RX ORDER — SODIUM CHLORIDE 0.9 % (FLUSH) 0.9 %
10 SYRINGE (ML) INJECTION AS NEEDED
Status: DISCONTINUED | OUTPATIENT
Start: 2022-02-14 | End: 2022-02-20 | Stop reason: HOSPADM

## 2022-02-14 RX ORDER — SODIUM CHLORIDE 0.9 % (FLUSH) 0.9 %
10 SYRINGE (ML) INJECTION EVERY 12 HOURS SCHEDULED
Status: DISCONTINUED | OUTPATIENT
Start: 2022-02-14 | End: 2022-02-20 | Stop reason: HOSPADM

## 2022-02-14 RX ORDER — DILTIAZEM HYDROCHLORIDE 5 MG/ML
10 INJECTION INTRAVENOUS ONCE
Status: COMPLETED | OUTPATIENT
Start: 2022-02-14 | End: 2022-02-14

## 2022-02-14 RX ORDER — PREDNISONE 1 MG/1
3 TABLET ORAL DAILY
Status: DISCONTINUED | OUTPATIENT
Start: 2022-02-15 | End: 2022-02-20 | Stop reason: HOSPADM

## 2022-02-14 RX ORDER — FAMOTIDINE 10 MG/ML
20 INJECTION, SOLUTION INTRAVENOUS DAILY
Status: DISCONTINUED | OUTPATIENT
Start: 2022-02-15 | End: 2022-02-19

## 2022-02-14 RX ADMIN — CEFTRIAXONE SODIUM 1 G: 1 INJECTION, POWDER, FOR SOLUTION INTRAMUSCULAR; INTRAVENOUS at 23:35

## 2022-02-14 RX ADMIN — SODIUM CHLORIDE 500 ML: 9 INJECTION, SOLUTION INTRAVENOUS at 21:51

## 2022-02-14 RX ADMIN — SODIUM CHLORIDE 500 ML: 9 INJECTION, SOLUTION INTRAVENOUS at 18:46

## 2022-02-14 RX ADMIN — SODIUM CHLORIDE 1000 ML: 9 INJECTION, SOLUTION INTRAVENOUS at 23:15

## 2022-02-14 RX ADMIN — DILTIAZEM HYDROCHLORIDE 10 MG: 5 INJECTION INTRAVENOUS at 18:46

## 2022-02-15 ENCOUNTER — APPOINTMENT (OUTPATIENT)
Dept: ULTRASOUND IMAGING | Facility: HOSPITAL | Age: 87
End: 2022-02-15

## 2022-02-15 LAB
ALBUMIN SERPL-MCNC: 3.7 G/DL (ref 3.5–5.2)
ALBUMIN/GLOB SERPL: 1.3 G/DL
ALP SERPL-CCNC: 82 U/L (ref 39–117)
ALT SERPL W P-5'-P-CCNC: 17 U/L (ref 1–41)
AMYLASE SERPL-CCNC: 134 U/L (ref 28–100)
ANION GAP SERPL CALCULATED.3IONS-SCNC: 14 MMOL/L (ref 5–15)
AST SERPL-CCNC: 28 U/L (ref 1–40)
BACTERIA UR QL AUTO: ABNORMAL /HPF
BILIRUB SERPL-MCNC: 0.4 MG/DL (ref 0–1.2)
BILIRUB UR QL STRIP: NEGATIVE
BUN SERPL-MCNC: 87 MG/DL (ref 8–23)
BUN/CREAT SERPL: 25.2 (ref 7–25)
CALCIUM SPEC-SCNC: 9.3 MG/DL (ref 8.2–9.6)
CHLORIDE SERPL-SCNC: 118 MMOL/L (ref 98–107)
CLARITY UR: CLEAR
CO2 SERPL-SCNC: 20 MMOL/L (ref 22–29)
COLOR UR: YELLOW
CREAT SERPL-MCNC: 3.45 MG/DL (ref 0.76–1.27)
DEPRECATED RDW RBC AUTO: 48 FL (ref 37–54)
ERYTHROCYTE [DISTWIDTH] IN BLOOD BY AUTOMATED COUNT: 14.5 % (ref 12.3–15.4)
GFR SERPL CREATININE-BSD FRML MDRD: 17 ML/MIN/1.73
GLOBULIN UR ELPH-MCNC: 2.8 GM/DL
GLUCOSE SERPL-MCNC: 113 MG/DL (ref 65–99)
GLUCOSE UR STRIP-MCNC: NEGATIVE MG/DL
HBA1C MFR BLD: 6.4 % (ref 4.8–5.6)
HCT VFR BLD AUTO: 45.3 % (ref 37.5–51)
HGB BLD-MCNC: 14.5 G/DL (ref 13–17.7)
HGB UR QL STRIP.AUTO: NEGATIVE
HYALINE CASTS UR QL AUTO: ABNORMAL /LPF
KETONES UR QL STRIP: NEGATIVE
LEUKOCYTE ESTERASE UR QL STRIP.AUTO: ABNORMAL
LIPASE SERPL-CCNC: 248 U/L (ref 13–60)
MAGNESIUM SERPL-MCNC: 2.6 MG/DL (ref 1.7–2.3)
MCH RBC QN AUTO: 29.4 PG (ref 26.6–33)
MCHC RBC AUTO-ENTMCNC: 32 G/DL (ref 31.5–35.7)
MCV RBC AUTO: 91.7 FL (ref 79–97)
NITRITE UR QL STRIP: NEGATIVE
PH UR STRIP.AUTO: <=5 [PH] (ref 5–9)
PLATELET # BLD AUTO: 192 10*3/MM3 (ref 140–450)
PMV BLD AUTO: 11.1 FL (ref 6–12)
POTASSIUM SERPL-SCNC: 4.6 MMOL/L (ref 3.5–5.2)
PROT SERPL-MCNC: 6.5 G/DL (ref 6–8.5)
PROT UR QL STRIP: ABNORMAL
QT INTERVAL: 310 MS
QTC INTERVAL: 421 MS
RBC # BLD AUTO: 4.94 10*6/MM3 (ref 4.14–5.8)
RBC # UR STRIP: ABNORMAL /HPF
REF LAB TEST METHOD: ABNORMAL
SODIUM SERPL-SCNC: 152 MMOL/L (ref 136–145)
SP GR UR STRIP: 1.02 (ref 1–1.03)
SQUAMOUS #/AREA URNS HPF: ABNORMAL /HPF
T4 FREE SERPL-MCNC: 1.02 NG/DL (ref 0.93–1.7)
TROPONIN T SERPL-MCNC: 0.07 NG/ML (ref 0–0.03)
TSH SERPL DL<=0.05 MIU/L-ACNC: 3.76 UIU/ML (ref 0.27–4.2)
UROBILINOGEN UR QL STRIP: ABNORMAL
WBC # UR STRIP: ABNORMAL /HPF
WBC NRBC COR # BLD: 8.51 10*3/MM3 (ref 3.4–10.8)

## 2022-02-15 PROCEDURE — 82150 ASSAY OF AMYLASE: CPT | Performed by: INTERNAL MEDICINE

## 2022-02-15 PROCEDURE — 76775 US EXAM ABDO BACK WALL LIM: CPT

## 2022-02-15 PROCEDURE — 84484 ASSAY OF TROPONIN QUANT: CPT | Performed by: INTERNAL MEDICINE

## 2022-02-15 PROCEDURE — 84443 ASSAY THYROID STIM HORMONE: CPT | Performed by: INTERNAL MEDICINE

## 2022-02-15 PROCEDURE — 85027 COMPLETE CBC AUTOMATED: CPT | Performed by: INTERNAL MEDICINE

## 2022-02-15 PROCEDURE — 84439 ASSAY OF FREE THYROXINE: CPT | Performed by: INTERNAL MEDICINE

## 2022-02-15 PROCEDURE — 80053 COMPREHEN METABOLIC PANEL: CPT | Performed by: INTERNAL MEDICINE

## 2022-02-15 PROCEDURE — 83036 HEMOGLOBIN GLYCOSYLATED A1C: CPT | Performed by: INTERNAL MEDICINE

## 2022-02-15 PROCEDURE — 81001 URINALYSIS AUTO W/SCOPE: CPT | Performed by: EMERGENCY MEDICINE

## 2022-02-15 PROCEDURE — 83735 ASSAY OF MAGNESIUM: CPT | Performed by: INTERNAL MEDICINE

## 2022-02-15 PROCEDURE — 25010000002 PIPERACILLIN SOD-TAZOBACTAM PER 1 G: Performed by: INTERNAL MEDICINE

## 2022-02-15 PROCEDURE — 83690 ASSAY OF LIPASE: CPT | Performed by: INTERNAL MEDICINE

## 2022-02-15 PROCEDURE — P9612 CATHETERIZE FOR URINE SPEC: HCPCS

## 2022-02-15 RX ORDER — ACETAMINOPHEN 325 MG/1
650 TABLET ORAL EVERY 6 HOURS PRN
Status: DISCONTINUED | OUTPATIENT
Start: 2022-02-15 | End: 2022-02-20 | Stop reason: HOSPADM

## 2022-02-15 RX ORDER — LANOLIN ALCOHOL/MO/W.PET/CERES
3 CREAM (GRAM) TOPICAL NIGHTLY PRN
Status: DISCONTINUED | OUTPATIENT
Start: 2022-02-15 | End: 2022-02-20 | Stop reason: HOSPADM

## 2022-02-15 RX ADMIN — SODIUM CHLORIDE 100 ML/HR: 4.5 INJECTION, SOLUTION INTRAVENOUS at 00:56

## 2022-02-15 RX ADMIN — APIXABAN 2.5 MG: 2.5 TABLET, FILM COATED ORAL at 21:07

## 2022-02-15 RX ADMIN — SODIUM CHLORIDE 5 MG/HR: 900 INJECTION, SOLUTION INTRAVENOUS at 00:53

## 2022-02-15 RX ADMIN — SODIUM CHLORIDE 100 ML/HR: 4.5 INJECTION, SOLUTION INTRAVENOUS at 17:28

## 2022-02-15 RX ADMIN — Medication 3 MG: at 01:44

## 2022-02-15 RX ADMIN — SODIUM CHLORIDE, PRESERVATIVE FREE 10 ML: 5 INJECTION INTRAVENOUS at 00:57

## 2022-02-15 RX ADMIN — PIPERACILLIN SODIUM AND TAZOBACTAM SODIUM 3.38 G: 3; .375 INJECTION, POWDER, LYOPHILIZED, FOR SOLUTION INTRAVENOUS at 10:21

## 2022-02-15 RX ADMIN — PIPERACILLIN SODIUM AND TAZOBACTAM SODIUM 3.38 G: 3; .375 INJECTION, POWDER, LYOPHILIZED, FOR SOLUTION INTRAVENOUS at 21:07

## 2022-02-15 RX ADMIN — PIPERACILLIN SODIUM AND TAZOBACTAM SODIUM 3.38 G: 3; .375 INJECTION, POWDER, LYOPHILIZED, FOR SOLUTION INTRAVENOUS at 01:44

## 2022-02-15 RX ADMIN — METOPROLOL TARTRATE 12.5 MG: 25 TABLET, FILM COATED ORAL at 21:07

## 2022-02-15 NOTE — PROGRESS NOTES
[facility] Medicine Services  INPATIENT PROGRESS NOTE    Length of Stay: 1  Date of Admission: 2/14/2022  Primary Care Physician: Adolfo Ackerman MD    Subjective   Chief Complaint: Hypoxia  HPI: Doing some better.  Tolerating Jell-O.    Review of Systems   Unable to perform ROS: Mental status change        All pertinent negatives and positives are as above. All other systems have been reviewed and are negative unless otherwise stated.     Objective    Temp:  [97.3 °F (36.3 °C)-97.4 °F (36.3 °C)] 97.4 °F (36.3 °C)  Heart Rate:  [] 81  Resp:  [16-18] 16  BP: ()/(51-95) 105/62    Physical Exam  Vitals reviewed.   Constitutional:       Appearance: He is well-developed.   HENT:      Head: Normocephalic and atraumatic.   Eyes:      Pupils: Pupils are equal, round, and reactive to light.   Cardiovascular:      Rate and Rhythm: Normal rate and regular rhythm.      Heart sounds: Normal heart sounds. No murmur heard.  No friction rub. No gallop.    Pulmonary:      Effort: Pulmonary effort is normal. No respiratory distress.      Breath sounds: Normal breath sounds. No wheezing or rales.   Chest:      Chest wall: No tenderness.   Abdominal:      General: Bowel sounds are normal. There is no distension.      Palpations: Abdomen is soft.      Tenderness: There is no abdominal tenderness.   Musculoskeletal:      Cervical back: Normal range of motion and neck supple.   Psychiatric:         Behavior: Behavior normal.       Results Review:  I have reviewed the labs, radiology results, and diagnostic studies.    Laboratory Data:   Results from last 7 days   Lab Units 02/15/22  0616 02/14/22  1804   SODIUM mmol/L 152* 150*   POTASSIUM mmol/L 4.6 5.0   CHLORIDE mmol/L 118* 110*   CO2 mmol/L 20.0* 19.0*   BUN mg/dL 87* 93*   CREATININE mg/dL 3.45* 3.33*   GLUCOSE mg/dL 113* 127*   CALCIUM mg/dL 9.3 11.1*   BILIRUBIN mg/dL 0.4 0.5   ALK PHOS U/L 82 100   ALT (SGPT) U/L 17  20   AST (SGOT) U/L 28 31   ANION GAP mmol/L 14.0 21.0*     Estimated Creatinine Clearance: 12.5 mL/min (A) (by C-G formula based on SCr of 3.45 mg/dL (H)).  Results from last 7 days   Lab Units 02/15/22  0616 02/14/22  1804   MAGNESIUM mg/dL 2.6* 3.0*         Results from last 7 days   Lab Units 02/15/22  0616 02/14/22  1804   WBC 10*3/mm3 8.51 10.48   HEMOGLOBIN g/dL 14.5 16.9   HEMATOCRIT % 45.3 50.6   PLATELETS 10*3/mm3 192 317           Culture Data:   No results found for: BLOODCX  No results found for: URINECX  No results found for: RESPCX  No results found for: WOUNDCX  No results found for: STOOLCX  No components found for: BODYFLD    Radiology Data:   Imaging Results (Last 24 Hours)     Procedure Component Value Units Date/Time    US Renal Bilateral [[id number]] Collected: 02/15/22 0101     Updated: 02/15/22 0648    Narrative:      EXAM:  US RETROPERITONEUM    ORDERING PROVIDER:  SAEID BEHROOZI    CLINICAL HISTORY:    Left renal mass, renal failure    COMPARISON STUDY:      TECHNIQUE:      Transverse and longitudinal sonographic and Doppler imaging of  bilateral kidneys and urinary bladder was performed.    FINDINGS:        RIGHT KIDNEY:  Seven one 6 x 4.2 x 4.7 cm. The right renal cortex  demonstrates unremarkable atrophic contour and increased renal  cortical echogenicity. No nephrolithiasis. No hydronephrosis. No  solid mass. A cyst is noted in the upper pole measuring 2.2 x 1.5  x 1.5 cm    LEFT  KIDNEY: Suboptimally visualized due to patient's inability  to stop moving . The recently demonstrated mass on the recent CT  scan in the lower pole is 7.6 x 7.8 x 6.5 cm.    URINARY BLADDER: Unremarkable      Impression:      The recently demonstrated mass on the recent CT scan in the lower  pole of the left kidney is 7.6 x 7.8 x 6.5 cm.  Atrophic right kidney with chronic medical renal disease.  Right upper pole cyst measuring 2.2 x 1.5 x 1.5 cm.    Electronically signed by:  Boubacar Quinonez MD  2/15/2022 6:46 AM  [facility]  Workstation: [id number]    CT Abdomen Pelvis Without Contrast [[id number]] Collected: 02/14/22 2209     Updated: 02/14/22 2234    Narrative:      EXAM: CT ABDOMEN PELVIS WO CONTRAST  RPID: CT ABDOMEN PELVIS WITHOUT IV CONTRAST    CLINICAL INDICATION: 89 years  old Male with history of elevated  lipase, ARCHANA, abd pain, elevated lactic acid    COMPARISON: None    TECHNIQUE: CT abdomen and pelvis were performed without IV  contrast. From this data, sagittal and coronal reconstructed  images were generated.  RADIATION DOSE REDUCTION: This exam was performed according to  the departmental dose-optimization program, including automated  exposure control, adjustment of the mA and kV according to  patient size, and iterative reconstruction technique; if  available.    FINDINGS:  LUNG BASES: Cardiac pacing leads are noted. No infiltrate is  seen. There are granulomatous right middle lobe. The descending  thoracic aorta is tortuous.    CT ABDOMEN: The gallbladder is markedly distended. A calcified  stone is observed. Correlate clinically for the potential of  cholecystitis. A subcapsular 1.8 x 1.2 cm cyst is observed in the  left lobe of the liver.  There is a mass projecting from the lower pole of the left kidney  6.2 x 5.6 x 6.6 cm. This is of heterogeneous density and is  suspicious for a renal cell carcinoma. Confirmation solid  characteristics with sonography should be made. Ultimately a  contrast-enhanced cross-sectional study may be required.    No bowel wall segments appear thickened or inflamed. Multiple  diverticula are noted.    There is diffuse atherosclerotic plaquing throughout the  aortoiliac system.      CT PELVIS: No free fluid is seen in the pelvis.    Diffuse degenerative changes are seen throughout the  thoracolumbar spine. No skeletal lytic lesions are observed.      Impression:        1. Marked distention of the gallbladder with a gallstone present.  Correlate clinically for cholecystitis.  2. There  is a large mass projecting inferiorly from the lower  pole of the left kidney, nonenhanced imaging features are  suspicious for renal cell carcinoma. Consider correlation with a  cross-sectional study if renal function allows or ultrasound.    Electronically signed by:  Calvin Amaya MD  2/14/2022 10:32 PM CST  Workstation: [id number]    XR Chest 1 View [[id number]] Collected: 02/14/22 2202     Updated: 02/14/22 2227    Narrative:      XR CHEST 1 VW  RPID: XR CHEST 1 VIEW  2/14/2022 10:20 PM CST    CLINICAL HISTORY:  89 years old Male with elevated trop, short of breath, elevated  trop    COMPARISON:  none  Number of views: 1  FINDINGS:  The cardiomediastinal silhouette is normal. The lungs are clear.  Pulmonary vascularity is normal. No pleural fluid is seen.    A cardiac pacer is noted on the left. Moderate aortic arch  calcifications are seen.      Impression:      No acute cardiothoracic process is observed.    Electronically signed by:  Calvin Amaya MD  2/14/2022 10:25 PM CST  Workstation: [id number]          I have reviewed the patient's current medications.     Assessment/Plan     Active Hospital Problems    Diagnosis    • Dehydration    • Acute renal failure (ARF) ([facility])        Plan:    1.  Acute kidney injury: Continue IV fluids.  Renal function slightly worse today.  2.  Atrial fibrillation with rapid ventricular response: Continue Cardizem drip.  3.  Chronic hypoxic respiratory failure: Status post COVID-19.  4.  Cholelithiasis  5.  Renal mass: Per family this will be monitored.  No work-up.  6.  DVT prophylaxis: Eliquis.    We will discuss with family tomorrow regarding transition to comfort measures.      The patient was evaluated during the global COVID-19 pandemic, and the diagnosis was suspected/considered upon their initial presentation.  Evaluation, treatment, and testing were consistent with current guidelines for patients who present with complaints or symptoms that may be related to COVID-19.    I  confirmed that the patient's Advance Care Plan is present, code status is documented, or surrogate decision maker is listed in the patient's medical record.       Discharge Planning: I expect patient to be discharged to skilled facility in 1-2 days.        This document has been electronically signed by Glen Caro MD on February 15, 2022 14:57 CST

## 2022-02-15 NOTE — ED NOTES
Pt had liquid stool. Pt cleaned and dried, brief changed. Cream applied to bottom. Pt tolerated well. Pt able to reposition himself from side to side, daughter at bedside     Jerrod Powell RN  02/15/22 9118

## 2022-02-15 NOTE — ED NOTES
To ultrasound with patient, cardiac monitor and oxygen. Pt tolerated well.     Jerrod Powell, RN  02/15/22 0207

## 2022-02-15 NOTE — ED NOTES
Pt had liquid BM, cleaned and dried, brief changed with madeleine RODRIGUEZ, pt tolerated well     Jerrod Powell, RN  02/14/22 2124

## 2022-02-15 NOTE — ED NOTES
Patient's daughter concerned with patient not sleeping and requested that his morning medications be held due to patient finally resting.      Fabiola Tobar, KRISTIN  02/15/22 0802

## 2022-02-15 NOTE — H&P
[facility] Medicine Admission      Date of Admission: 2/14/2022      Primary Care Physician: Adolfo Ackerman MD      Chief Complaint: Low oxygen level    HPI:  Patient is a 89-year-old chronically ill looking male, resident of skilled nursing facility for about 3 weeks following COVID-19 infection and deconditioning and development of hypoxemia and O2 dependency at that time as history of chronic kidney disease a stage III hypertension hyperlipidemia paroxysmal atrial fibrillation on Eliquis, pneumonia, pacemaker placement who was sent from local nursing facility to ED concerning low oxygen number and reported abdominal pain.  Patient had A. fib with RVR in ED was given diltiazem 10 mg IV.  Patient had abnormal CT of the abdomen and pelvis as abnormal laboratory work-up.  Hospitalist service was called for admission of the patient.  Patient was seen and examined in ED room 12.  His daughter Bhavani, and Bhavani's  at bedside.  Patient denies any particular complaint.  Mainly talks about his bowel habit.  Per daughter he has chronic diarrhea.  He had weight loss over several weeks and months.  He has been on supplemental O2 2 L for several weeks.  He has been debilitated and deconditioned and has not been able to do much physical therapy nursing home.  He may had 2 falls in assisted living in a nursing home over the last several weeks.  He may had decreased p.o. intake recently.  Patient denies any particular complaint including no headache sore throat chest pain shortness of breath abdominal pain back pain, difficulty urinating.     Concurrent Medical History:  has a past medical history of Bradycardia, Chronic renal failure, stage 3 (moderate) (HCC), Depression, Hypertension, Hypothyroidism, Paroxysmal atrial fibrillation (HCC), and Pneumonia.    Past Surgical History:  has a past surgical history that includes Appendectomy; Prostate surgery; Pacemaker Implantation; Back  surgery; Prostate surgery; Insert / replace / remove pacemaker; Cataract extraction, bilateral; and Cardiac electrophysiology procedure (N/A, 11/19/2020).    Family History: family history includes No Known Problems in his father and mother.     Social History:  reports that he has quit smoking. He has never used smokeless tobacco. He reports that he does not drink alcohol and does not use drugs.    Allergies: No Known Allergies    Medications:   Prior to Admission medications    Medication Sig Start Date End Date Taking? Authorizing Provider   apixaban (ELIQUIS) 2.5 MG tablet tablet Take 2.5 mg by mouth 2 (Two) Times a Day.    Nancy Macias MD   Calcium Carb-Cholecalciferol (OYSTER SHELL CALCIUM/VITAMIN D) 250-125 MG-UNIT tablet tablet Take  by mouth 2 (Two) Times a Day.       dilTIAZem CD (CARDIZEM CD) 180 MG 24 hr capsule Take 1 capsule by mouth Daily. 12/16/20   Marcos Barba MD   metoprolol tartrate (LOPRESSOR) 25 MG tablet Take 12.5 mg by mouth 2 (Two) Times a Day.    Nancy Macias MD   Multiple Vitamins-Minerals (MULTIVITAMIN PO) Take  by mouth.    Nancy Macias MD   ondansetron ODT (ZOFRAN-ODT) 4 MG disintegrating tablet Place 1 tablet on the tongue Every 6 (Six) Hours As Needed for Nausea or Vomiting. 1/18/22   Paul Felix MD   predniSONE (DELTASONE) 2.5 MG tablet Take 3 mg by mouth Daily.    Nancy Macias MD   pseudoephedrine-guaifenesin (MUCINEX D)  MG per 12 hr tablet Take 1 tablet by mouth Every 12 (Twelve) Hours. 1/18/22   Paul Felix MD   sertraline (ZOLOFT) 50 MG tablet Take 50 mg by mouth Daily.    Nancy Macias MD       Review of Systems:  Review of Systems   Constitutional: Positive for activity change, appetite change and fatigue. Negative for chills, diaphoresis and fever.   HENT: Negative for congestion, dental problem, ear pain, facial swelling, rhinorrhea and sinus pressure.    Eyes: Negative for photophobia, discharge,  redness, itching and visual disturbance.   Respiratory: Positive for shortness of breath. Negative for apnea, cough, choking, chest tightness, wheezing and stridor.    Cardiovascular: Negative for chest pain, palpitations and leg swelling.   Gastrointestinal: Positive for diarrhea. Negative for abdominal distention, abdominal pain, anal bleeding, blood in stool, nausea, rectal pain and vomiting.   Endocrine: Negative for cold intolerance, heat intolerance, polydipsia, polyphagia and polyuria.   Genitourinary: Negative for difficulty urinating, flank pain, frequency, hematuria and urgency.   Musculoskeletal: Negative for arthralgias, back pain, joint swelling and myalgias.   Skin: Negative for pallor, rash and wound.   Allergic/Immunologic: Negative for environmental allergies and immunocompromised state.   Neurological: Negative for dizziness, tremors, seizures, facial asymmetry, speech difficulty, weakness, light-headedness, numbness and headaches.   Hematological: Negative for adenopathy. Does not bruise/bleed easily.   Psychiatric/Behavioral: Negative for agitation, behavioral problems and hallucinations. The patient is not nervous/anxious.       Otherwise complete ROS is negative except as mentioned above.  Review of system is not obtainable from the patient in particular.  Review of system was discussed with his daughter, therefore the value of obtained information for review of system is limited.    Physical Exam:   Temp:  [97.3 °F (36.3 °C)] 97.3 °F (36.3 °C)  Heart Rate:  [104-122] 122  Resp:  [18] 18  BP: (110-171)/(63-88) 114/75  Physical Exam  Constitutional:       General: He is not in acute distress.     Appearance: He is not ill-appearing, toxic-appearing or diaphoretic.      Comments: Slim, chronic ill-looking elderly male   HENT:      Head: Normocephalic and atraumatic.      Right Ear: External ear normal.      Left Ear: External ear normal.      Nose: Nose normal.      Mouth/Throat:      Mouth:  Mucous membranes are moist.      Pharynx: Oropharynx is clear.   Eyes:      Extraocular Movements: Extraocular movements intact.      Conjunctiva/sclera: Conjunctivae normal.      Pupils: Pupils are equal, round, and reactive to light.   Cardiovascular:      Rate and Rhythm: Tachycardia present. Rhythm irregular.      Heart sounds: Murmur heard.   No friction rub. No gallop.    Pulmonary:      Effort: No respiratory distress.      Breath sounds: No stridor. No wheezing or rales.   Chest:      Chest wall: No tenderness.   Abdominal:      General: Abdomen is flat. There is no distension.      Palpations: Abdomen is soft.      Tenderness: There is no abdominal tenderness. There is no guarding or rebound.   Musculoskeletal:         General: No swelling or tenderness.      Cervical back: No rigidity or tenderness.      Right lower leg: No edema.      Left lower leg: No edema.      Comments: Decreased muscular mass   Lymphadenopathy:      Cervical: No cervical adenopathy.   Skin:     General: Skin is warm and dry.      Coloration: Skin is not jaundiced.      Findings: No erythema.   Neurological:      Mental Status: He is alert. Mental status is at baseline.      Sensory: No sensory deficit.      Motor: No weakness.      Coordination: Coordination normal.      Comments: Oriented to person and place   Psychiatric:         Mood and Affect: Mood normal.         Behavior: Behavior normal.         Judgment: Judgment normal.           Results Reviewed:  I have personally reviewed current lab, radiology, and data and agree with results.  Lab Results (last 24 hours)       Procedure Component Value Units Date/Time    Extra Tubes [[id number]] Collected: 02/14/22 1804    Specimen: Blood Updated: 02/14/22 2215    Narrative:      The following orders were created for panel order Extra Tubes.  Procedure                               Abnormality         Status                     ---------                               -----------          ------                     Gold Top - [facility][[id number]]                                   Final result               [facility] Blood Culture Dionisio...[[id number]]                      Final result               Gray Top[[id number]]                                         Final result               Light Blue Top[[id number]]                                   Final result                 Please view results for these tests on the individual orders.    Gray Top [[id number]] Collected: 02/14/22 1804    Specimen: Blood Updated: 02/14/22 2215     Extra Tube Hold for add-ons.     Comment: Auto resulted.       STAT Lactic Acid, Reflex [[id number]]  (Abnormal) Collected: 02/14/22 2129    Specimen: Blood Updated: 02/14/22 2149     Lactate 4.5 mmol/L     Blood Gas, Arterial - [[id number]]  (Abnormal) Collected: 02/14/22 2107    Specimen: Arterial Blood Updated: 02/14/22 2114     Site Left Radial     Allan's Test N/A     pH, Arterial 7.549 pH units      Comment: 83 Value above reference range        pCO2, Arterial 14.6 mm Hg      Comment: 85 Value below critical limit        pO2, Arterial 147.0 mm Hg      Comment: 83 Value above reference range        HCO3, Arterial 12.7 mmol/L      Comment: 84 Value below reference range        Base Excess, Arterial -5.8 mmol/L      Comment: 84 Value below reference range        O2 Saturation, Arterial 99.7 %      Comment: 83 Value above reference range        Barometric Pressure for Blood Gas 756 mmHg      Modality Room Air     Ventilator Mode NA     Collected by [email or web address]     Comment: Meter: [id number]     :  [id number]       Blood Culture - Blood, Arm, Left [[id number]] Collected: 02/14/22 1943    Specimen: Blood from Arm, Left Updated: 02/14/22 1945    Blood Culture - Blood, Arm, Right [[id number]] Collected: 02/14/22 1814    Specimen: Blood from Arm, Right Updated: 02/14/22 1927    Troponin [[id number]]  (Abnormal) Collected: 02/14/22 1804    Specimen: Blood Updated: 02/14/22 1922      Troponin T 0.080 ng/mL     Narrative:      Troponin T Reference Range:  <= 0.03 ng/mL-   Negative for AMI  >0.03 ng/mL-     Abnormal for myocardial necrosis.  Clinicians would have to utilize clinical acumen, EKG, Troponin and serial changes to determine if it is an Acute Myocardial Infarction or myocardial injury due to an underlying chronic condition.       Results may be falsely decreased if patient taking Biotin.      Comprehensive Metabolic Panel [[id number]]  (Abnormal) Collected: 02/14/22 1804    Specimen: Blood Updated: 02/14/22 1915     Glucose 127 mg/dL      BUN 93 mg/dL      Creatinine 3.33 mg/dL      Sodium 150 mmol/L      Potassium 5.0 mmol/L      Chloride 110 mmol/L      CO2 19.0 mmol/L      Calcium 11.1 mg/dL      Total Protein 8.1 g/dL      Albumin 4.50 g/dL      ALT (SGPT) 20 U/L      AST (SGOT) 31 U/L      Alkaline Phosphatase 100 U/L      Total Bilirubin 0.5 mg/dL      eGFR Non African Amer 17 mL/min/1.73      Globulin 3.6 gm/dL      A/G Ratio 1.3 g/dL      BUN/Creatinine Ratio 27.9     Anion Gap 21.0 mmol/L     Narrative:      GFR Normal >60  Chronic Kidney Disease <60  Kidney Failure <15      Lipase [[id number]]  (Abnormal) Collected: 02/14/22 1804    Specimen: Blood Updated: 02/14/22 1915     Lipase 296 U/L     Magnesium [[id number]]  (Abnormal) Collected: 02/14/22 1804    Specimen: Blood Updated: 02/14/22 1915     Magnesium 3.0 mg/dL     Gold Top - SST [[id number]] Collected: 02/14/22 1804    Specimen: Blood Updated: 02/14/22 1915     Extra Tube Hold for add-ons.     Comment: Auto resulted.       Light Blue Top [[id number]] Collected: 02/14/22 1804    Specimen: Blood Updated: 02/14/22 1915     Extra Tube hold for add-on     Comment: Auto resulted       [facility] Blood Culture Bottle Set [[id number]] Collected: 02/14/22 1804    Specimen: Blood Updated: 02/14/22 1915     Extra Tube Hold for add-ons.     Comment: Auto resulted.       BNP [[id number]]  (Abnormal) Collected: 02/14/22 1804    Specimen: Blood  Updated: 02/14/22 1913     proBNP 8,394.0 pg/mL     Narrative:      Among patients with dyspnea, NT-proBNP is highly sensitive for the detection of acute congestive heart failure. In addition NT-proBNP of <300 pg/ml effectively rules out acute congestive heart failure with 99% negative predictive value.    Results may be falsely decreased if patient taking Biotin.      Lactic Acid, Plasma [[id number]]  (Abnormal) Collected: 02/14/22 1804    Specimen: Blood Updated: 02/14/22 1842     Lactate 3.5 mmol/L     D-dimer, Quantitative [[id number]]  (Abnormal) Collected: 02/14/22 1804    Specimen: Blood Updated: 02/14/22 1831     D-Dimer, Quantitative 514 ng/mL (FEU)     Narrative:      Dimer values <500 ng/ml FEU are FDA approved as aid in diagnosis of deep venous thrombosis and pulmonary embolism.  This test should not be used in an exclusion strategy with pretest probability alone.    A recent guideline regarding diagnosis for pulmonary thromboembolism recommends an adjusted exclusion criterion of age x 10 ng/ml FEU for patients >50 years of age ([employer] Intern Med 2015; 163: 701-711).      CBC & Differential [[id number]]  (Abnormal) Collected: 02/14/22 1804    Specimen: Blood Updated: 02/14/22 1809    Narrative:      The following orders were created for panel order CBC & Differential.  Procedure                               Abnormality         Status                     ---------                               -----------         ------                     CBC Auto Differential[[id number]]        Abnormal            Final result                 Please view results for these tests on the individual orders.    CBC Auto Differential [[id number]]  (Abnormal) Collected: 02/14/22 1804    Specimen: Blood Updated: 02/14/22 1809     WBC 10.48 10*3/mm3      RBC 5.66 10*6/mm3      Hemoglobin 16.9 g/dL      Hematocrit 50.6 %      MCV 89.4 fL      MCH 29.9 pg      MCHC 33.4 g/dL      RDW 14.5 %      RDW-SD 46.4 fl      MPV 11.3 fL       Platelets 317 10*3/mm3      Neutrophil % 85.0 %      Lymphocyte % 7.3 %      Monocyte % 5.9 %      Eosinophil % 0.3 %      Basophil % 0.6 %      Immature Grans % 0.9 %      Neutrophils, Absolute 8.91 10*3/mm3      Lymphocytes, Absolute 0.77 10*3/mm3      Monocytes, Absolute 0.62 10*3/mm3      Eosinophils, Absolute 0.03 10*3/mm3      Basophils, Absolute 0.06 10*3/mm3      Immature Grans, Absolute 0.09 10*3/mm3      nRBC 0.0 /100 WBC           Imaging Results (Last 24 Hours)       Procedure Component Value Units Date/Time    CT Abdomen Pelvis Without Contrast [[id number]] Collected: 02/14/22 2209     Updated: 02/14/22 2234    Narrative:      EXAM: CT ABDOMEN PELVIS WO CONTRAST  RPID: CT ABDOMEN PELVIS WITHOUT IV CONTRAST    CLINICAL INDICATION: 89 years  old Male with history of elevated  lipase, ARCHANA, abd pain, elevated lactic acid    COMPARISON: None    TECHNIQUE: CT abdomen and pelvis were performed without IV  contrast. From this data, sagittal and coronal reconstructed  images were generated.  RADIATION DOSE REDUCTION: This exam was performed according to  the departmental dose-optimization program, including automated  exposure control, adjustment of the mA and kV according to  patient size, and iterative reconstruction technique; if  available.    FINDINGS:  LUNG BASES: Cardiac pacing leads are noted. No infiltrate is  seen. There are granulomatous right middle lobe. The descending  thoracic aorta is tortuous.    CT ABDOMEN: The gallbladder is markedly distended. A calcified  stone is observed. Correlate clinically for the potential of  cholecystitis. A subcapsular 1.8 x 1.2 cm cyst is observed in the  left lobe of the liver.  There is a mass projecting from the lower pole of the left kidney  6.2 x 5.6 x 6.6 cm. This is of heterogeneous density and is  suspicious for a renal cell carcinoma. Confirmation solid  characteristics with sonography should be made. Ultimately a  contrast-enhanced cross-sectional study  may be required.    No bowel wall segments appear thickened or inflamed. Multiple  diverticula are noted.    There is diffuse atherosclerotic plaquing throughout the  aortoiliac system.      CT PELVIS: No free fluid is seen in the pelvis.    Diffuse degenerative changes are seen throughout the  thoracolumbar spine. No skeletal lytic lesions are observed.      Impression:        1. Marked distention of the gallbladder with a gallstone present.  Correlate clinically for cholecystitis.  2. There is a large mass projecting inferiorly from the lower  pole of the left kidney, nonenhanced imaging features are  suspicious for renal cell carcinoma. Consider correlation with a  cross-sectional study if renal function allows or ultrasound.    Electronically signed by:  Calvin Amaya MD  2/14/2022 10:32 PM CST  Workstation: [id number]    XR Chest 1 View [[id number]] Collected: 02/14/22 2202     Updated: 02/14/22 2227    Narrative:      XR CHEST 1 VW  RPID: XR CHEST 1 VIEW  2/14/2022 10:20 PM CST    CLINICAL HISTORY:  89 years old Male with elevated trop, short of breath, elevated  trop    COMPARISON:  none  Number of views: 1  FINDINGS:  The cardiomediastinal silhouette is normal. The lungs are clear.  Pulmonary vascularity is normal. No pleural fluid is seen.    A cardiac pacer is noted on the left. Moderate aortic arch  calcifications are seen.      Impression:      No acute cardiothoracic process is observed.    Electronically signed by:  Calvin Amaya MD  2/14/2022 10:25 PM CST  Workstation: [id number]              Assessment:    Active Hospital Problems    Diagnosis     Dehydration      # Sepsis, sepsis present on admission cannot be excluded considering elevated lactic acid level  # Atrial fibrillation with rapid ventricular response   # Dehydration  # Hyernatremia possible secondary to free water deficit  # Acute renal failure with metabolic acidosis  Elevated lactic acid level  # Chronic hypoxemic respiratory failure with   status post COVID-19 infection  # Potential left renal mass  # Elevated proBNP level nonspecific   # Gallbladder distention with gallstone          Plan:  Place on telemetry  Supplemental O2, noninvasive positive pressure ventilation as needed.  Placed on Cardizem drip for control of  heart rate.  Placed on IV fluid  Placed on broad-spectrum IV antibiotic Zosyn  Avoid electrolyte abnormalities  See response of hypernatremia and acute renal failure to IV fluid.  Obtain kidney ultrasound  Comorbidities will be treated appropriately  Further decision making is depending on response to above care  Please see orders for comprehensive plan.  Prognosis guarded in best case scenario.    I confirmed that the patient's Advance Care Plan is present, code status is documented, or surrogate decision maker is listed in the patient's medical record.   Discussed advanced directive and code the status with patient and his daughter.  Patient is DNR/DNI.  Her daughter Bhavani Breaux is his healthcare proxy.    I have utilized all available immediate resources to obtain, update, or review the patient's current medications.     I discussed the patient's findings and my recommendations with: Patient and her daughter at bedside both agreed with above plan of care.      Saeid Behroozi, MD   02/14/22   23:34 CST

## 2022-02-15 NOTE — SIGNIFICANT NOTE
02/15/22 0846   OTHER   Discipline occupational therapist; physical therapist   Rehab Time/Intention   Session Not Performed other (see comments)   Recommendation   PT - Next Appointment 02/16/22   Recommendation   OT - Next Appointment 02/16/22   OT/PT evaluations attempted this AM. Pt daughter states that pt had a rough night and is finally sleeping, and requests therapy let pt rest at this time. Will follow up as time and pt allow.

## 2022-02-15 NOTE — ED NOTES
RN went to bedside to give medication. Pt moaning and very confused. Attempted to redirect patient. Pt attempting to get out of bed. RN readjusted bed, placed bed alarm and falls band/socks on patient. Pt stated he had to have a bowel movement. RN placed patient on bed pain. Pt did not go. RN attempted to place urinal to see if patient had to pee. Unsuccessful as well. Primary RN aware.     Margoth Marcos RN  02/14/22 2074

## 2022-02-15 NOTE — ED NOTES
Daughter, tracy ram. At pt's bedside wanting results and plan of care.  Daughter is tired and requesting the admitting provider call her with test results.  She can be reached at [phone].       Joyce Duran RN  02/15/22 2286

## 2022-02-15 NOTE — ED NOTES
Attempted cath for urine, no urine returned, provider made aware     Jerrod Powell, RN  02/14/22 1945

## 2022-02-15 NOTE — ED PROVIDER NOTES
Subjective   89-year-old male was brought to the emergency department via EMS from his nursing home with concern for reported shortness of breath and possible abdominal pain.  Patient has significant dementia and cannot provide history or review of systems.  He did have Covid last month.  He has chronic respiratory failure on 2 L nasal cannula but EMS found him to be hypoxic on this amount of oxygen.  He also has known paroxysmal atrial fibrillation on anticoagulation and is found to be tachycardic with irregular heartbeat.    Family history, surgical history, social history, current medications and allergies are reviewed with the patient's medical records; and triage documentation and vitals are reviewed.      History provided by:  Medical records, EMS personnel and nursing home  History limited by:  Dementia   used: No        Review of Systems   Unable to perform ROS: Dementia       Past Medical History:   Diagnosis Date   • Bradycardia    • Chronic renal failure, stage 3 (moderate) (HCC)    • Depression    • Hypertension    • Hypothyroidism    • Paroxysmal atrial fibrillation (HCC)    • Pneumonia        No Known Allergies    Past Surgical History:   Procedure Laterality Date   • APPENDECTOMY     • BACK SURGERY     • CARDIAC ELECTROPHYSIOLOGY PROCEDURE N/A 11/19/2020    Procedure: PPM generator change - dual;  Surgeon: Marcos Barba MD;  Location: [facility] INVASIVE LOCATION;  Service: Cardiology;  Laterality: N/A;   • CATARACT EXTRACTION, BILATERAL     • INSERT / REPLACE / REMOVE PACEMAKER     • PACEMAKER IMPLANTATION     • PROSTATE SURGERY     • PROSTATE SURGERY         Family History   Problem Relation Age of Onset   • No Known Problems Mother    • No Known Problems Father        Social History     Socioeconomic History   • Marital status:    Tobacco Use   • Smoking status: Former Smoker   • Smokeless tobacco: Never Used   Substance and Sexual Activity   • Alcohol use: No   •  Drug use: No   • Sexual activity: Defer           Objective   Physical Exam  Vitals and nursing note reviewed.   Constitutional:       General: He is not in acute distress.     Appearance: He is underweight. He is ill-appearing.   HENT:      Head: Normocephalic.   Eyes:      General: No scleral icterus.     Pupils: Pupils are equal, round, and reactive to light.   Cardiovascular:      Rate and Rhythm: Tachycardia present. Rhythm irregularly irregular.      Heart sounds: No murmur heard.      Pulmonary:      Effort: Pulmonary effort is normal. No respiratory distress.      Breath sounds: Normal breath sounds. No wheezing or rhonchi.   Abdominal:      General: Bowel sounds are decreased. There is no distension.      Palpations: Abdomen is soft. There is no hepatomegaly or splenomegaly.      Tenderness: There is no abdominal tenderness. There is no guarding or rebound. Negative signs include Penny's sign and McBurney's sign.   Skin:     General: Skin is warm and dry.      Capillary Refill: Capillary refill takes less than 2 seconds.      Coloration: Skin is pale.   Neurological:      General: No focal deficit present.      Mental Status: He is alert. He is disoriented.   Psychiatric:         Mood and Affect: Mood is anxious.         Procedures  none         ED Course      Labs Reviewed   COMPREHENSIVE METABOLIC PANEL - Abnormal; Notable for the following components:       Result Value    Glucose 127 (*)     BUN 93 (*)     Creatinine 3.33 (*)     Sodium 150 (*)     Chloride 110 (*)     CO2 19.0 (*)     Calcium 11.1 (*)     eGFR Non African Amer 17 (*)     BUN/Creatinine Ratio 27.9 (*)     Anion Gap 21.0 (*)     All other components within normal limits    Narrative:     GFR Normal >60  Chronic Kidney Disease <60  Kidney Failure <15     CBC WITH AUTO DIFFERENTIAL - Abnormal; Notable for the following components:    Neutrophil % 85.0 (*)     Lymphocyte % 7.3 (*)     Immature Grans % 0.9 (*)     Neutrophils, Absolute 8.91  (*)     Immature Grans, Absolute 0.09 (*)     All other components within normal limits   LIPASE - Abnormal; Notable for the following components:    Lipase 296 (*)     All other components within normal limits   BNP (IN-HOUSE) - Abnormal; Notable for the following components:    proBNP 8,394.0 (*)     All other components within normal limits    Narrative:     Among patients with dyspnea, NT-proBNP is highly sensitive for the detection of acute congestive heart failure. In addition NT-proBNP of <300 pg/ml effectively rules out acute congestive heart failure with 99% negative predictive value.    Results may be falsely decreased if patient taking Biotin.     TROPONIN (IN-HOUSE) - Abnormal; Notable for the following components:    Troponin T 0.080 (*)     All other components within normal limits    Narrative:     Troponin T Reference Range:  <= 0.03 ng/mL-   Negative for AMI  >0.03 ng/mL-     Abnormal for myocardial necrosis.  Clinicians would have to utilize clinical acumen, EKG, Troponin and serial changes to determine if it is an Acute Myocardial Infarction or myocardial injury due to an underlying chronic condition.       Results may be falsely decreased if patient taking Biotin.     LACTIC ACID, PLASMA - Abnormal; Notable for the following components:    Lactate 3.5 (*)     All other components within normal limits   MAGNESIUM - Abnormal; Notable for the following components:    Magnesium 3.0 (*)     All other components within normal limits   D-DIMER, QUANTITATIVE - Abnormal; Notable for the following components:    D-Dimer, Quantitative 514 (*)     All other components within normal limits    Narrative:     Dimer values <500 ng/ml FEU are FDA approved as aid in diagnosis of deep venous thrombosis and pulmonary embolism.  This test should not be used in an exclusion strategy with pretest probability alone.    A recent guideline regarding diagnosis for pulmonary thromboembolism recommends an adjusted exclusion  criterion of age x 10 ng/ml FEU for patients >50 years of age ([employer] Intern Med 2015; 163: 701-711).     LACTIC ACID, REFLEX - Abnormal; Notable for the following components:    Lactate 4.5 (*)     All other components within normal limits   BLOOD GAS, ARTERIAL - Abnormal; Notable for the following components:    pH, Arterial 7.549 (*)     pCO2, Arterial 14.6 (*)     pO2, Arterial 147.0 (*)     HCO3, Arterial 12.7 (*)     Base Excess, Arterial -5.8 (*)     O2 Saturation, Arterial 99.7 (*)     All other components within normal limits   BLOOD CULTURE   BLOOD CULTURE   URINALYSIS W/ MICROSCOPIC IF INDICATED (NO CULTURE)   BLOOD GAS, ARTERIAL   CBC AND DIFFERENTIAL    Narrative:     The following orders were created for panel order CBC & Differential.  Procedure                               Abnormality         Status                     ---------                               -----------         ------                     CBC Auto Differential[[id number]]        Abnormal            Final result                 Please view results for these tests on the individual orders.   EXTRA TUBES    Narrative:     The following orders were created for panel order Extra Tubes.  Procedure                               Abnormality         Status                     ---------                               -----------         ------                     Gold Top - SST[[id number]]                                   Final result               [facility] Blood Culture Dionisio...[[id number]]                      Final result               Gray Top[[id number]]                                         Final result               Light Blue Top[[id number]]                                   Final result                 Please view results for these tests on the individual orders.   GOLD TOP - SST   RAINBOW BLOOD CULTURE BOTTLES - 1 SET   GRAY TOP   LIGHT BLUE TOP     CT Abdomen Pelvis Without Contrast    Result Date: 2/14/2022  Narrative: EXAM: CT ABDOMEN  PELVIS WO CONTRAST RPID: CT ABDOMEN PELVIS WITHOUT IV CONTRAST CLINICAL INDICATION: 89 years  old Male with history of elevated lipase, ARCHANA, abd pain, elevated lactic acid COMPARISON: None TECHNIQUE: CT abdomen and pelvis were performed without IV contrast. From this data, sagittal and coronal reconstructed images were generated. RADIATION DOSE REDUCTION: This exam was performed according to the departmental dose-optimization program, including automated exposure control, adjustment of the mA and kV according to patient size, and iterative reconstruction technique; if available. FINDINGS: LUNG BASES: Cardiac pacing leads are noted. No infiltrate is seen. There are granulomatous right middle lobe. The descending thoracic aorta is tortuous. CT ABDOMEN: The gallbladder is markedly distended. A calcified stone is observed. Correlate clinically for the potential of cholecystitis. A subcapsular 1.8 x 1.2 cm cyst is observed in the left lobe of the liver. There is a mass projecting from the lower pole of the left kidney 6.2 x 5.6 x 6.6 cm. This is of heterogeneous density and is suspicious for a renal cell carcinoma. Confirmation solid characteristics with sonography should be made. Ultimately a contrast-enhanced cross-sectional study may be required. No bowel wall segments appear thickened or inflamed. Multiple diverticula are noted. There is diffuse atherosclerotic plaquing throughout the aortoiliac system. CT PELVIS: No free fluid is seen in the pelvis. Diffuse degenerative changes are seen throughout the thoracolumbar spine. No skeletal lytic lesions are observed.     Impression: 1. Marked distention of the gallbladder with a gallstone present. Correlate clinically for cholecystitis. 2. There is a large mass projecting inferiorly from the lower pole of the left kidney, nonenhanced imaging features are suspicious for renal cell carcinoma. Consider correlation with a cross-sectional study if renal function allows or  ultrasound. Electronically signed by:  Calvin Amaya MD  2/14/2022 10:32 PM CST Workstation: [id number]    CT Head Without Contrast    Result Date: 1/18/2022  Narrative: EXAM DESCRIPTION: CT HEAD WO CONTRAST CLINICAL HISTORY: Dizziness, non-specific TECHNIQUE: 5mm slice thickness axial images through the brain were performed in bone and soft tissue windows in the absence of intravenous contrast.  This exam was performed according to our departmental dose-optimization program which includes use of Automated Exposure Control, adjustment of the mA and/or kV according to patient size and/or use of iterative reconstruction technique. COMPARISON: None. FINDINGS: There is diffuse age-appropriate atrophy seen throughout the brain parenchyma. Mild periventricular white matter changes are seen to be present and there is mild ex vacuo dilatation of the ventricular system. There is no intra-axial or extra-axial bleed. There is no mass or mass effect. The visualized paranasal sinuses and mastoid air cells are patent. No fracture is present.     Impression: No acute intracranial abnormality identified. Electronically signed by:  Dannie Montez MD, LOI  1/18/2022 8:08 PM CST Workstation: [id number]    XR Chest 1 View    Result Date: 2/14/2022  Narrative: XR CHEST 1 VW RPID: XR CHEST 1 VIEW  2/14/2022 10:20 PM CST CLINICAL HISTORY: 89 years old Male with elevated trop, short of breath, elevated trop COMPARISON: none Number of views: 1 FINDINGS: The cardiomediastinal silhouette is normal. The lungs are clear. Pulmonary vascularity is normal. No pleural fluid is seen. A cardiac pacer is noted on the left. Moderate aortic arch calcifications are seen.     Impression: No acute cardiothoracic process is observed. Electronically signed by:  Calvin Amaya MD  2/14/2022 10:25 PM CST Workstation: [id number]    XR Chest 1 View    Result Date: 1/18/2022  Narrative: INDICATION: cough EXAMINATION/TECHNIQUE: X-RAY - XR CHEST 1 VIEW COMPARISON:  March 3, 2014 ____________________________________________ FINDINGS:  LINES/DEVICES: Pacemaker leads are stable in position. LUNGS: Lung markings are mildly prominent. There is no focal consolidation. Lucency paralleling the right lateral chest wall is related to a skinfold. There is no evidence of pneumothorax or pleural effusion. MEDIASTINUM AND CARDIOVASCULAR STRUCTURES: Cardiac silhouette not enlarged. Central airways and mediastinal contour are unremarkable. BONES AND SOFT TISSUES: Unremarkable.     Impression: No radiographic evidence of acute cardiopulmonary disease. Electronically signed by:  Omid Varma MD  1/18/2022 8:01 PM CST Workstation: [id number]    EKG February 14, 2022 at 1735 reveals atrial fibrillation with RVR at a rate of 111 bpm.  Baseline artifact with no obvious acute ischemia.        MDM  Number of Diagnoses or Management Options     Amount and/or Complexity of Data Reviewed  Clinical lab tests: reviewed  Tests in the radiology section of CPT®: reviewed  Tests in the medicine section of CPT®: reviewed  Decide to obtain previous medical records or to obtain history from someone other than the patient: yes  Discuss the patient with other providers: yes    Patient Progress  Patient progress: stable    Patient found to have a myriad of issues including unknown renal mass, enlarged gallbladder with gallstones but no acute cholecystitis and unremarkable liver labs.  No evidence of infectious process on labs we did a lactic acid of 4-1/2 Rocephin is given.  Started on fluid hydration given dehydration, ARCHANA on chronic kidney disease and lactic acid.  Discussed findings and concerns with daughter as patient has baseline dementia.  She reports that she is unsure of how invasive she would want things to be but would like to initiate treatment for his dehydration and discuss further options.  Patient will be admitted to the hospitalist.  Recent Covid positive but is outside of the quarantine  window.    Final diagnoses:   Dehydration   ARCHANA (acute kidney injury) (HCC)   Kidney mass   Calculus of gallbladder without cholecystitis without obstruction   Hypoxia       ED Disposition  ED Disposition     ED Disposition Condition Comment    Decision to Admit            No follow-up provider specified.       Medication List      No changes were made to your prescriptions during this visit.          Venu Caro DO  02/14/22 0810

## 2022-02-15 NOTE — ED NOTES
Cardizem held by nightshift nurse due to blood pressure dropping. Heart rate controlled at this time. Will continue to hold cardizem due to lower blood pressures. Will continue to monitor.      Fabiola Tobar, KRISTIN  02/15/22 [id number]

## 2022-02-15 NOTE — ED NOTES
Pt had a liquid BM, pt cleaned and dried, brief changed. Pt tolerated well.     Jerrod Powell RN  02/15/22 0035

## 2022-02-16 LAB
ANION GAP SERPL CALCULATED.3IONS-SCNC: 11 MMOL/L (ref 5–15)
BUN SERPL-MCNC: 67 MG/DL (ref 8–23)
BUN/CREAT SERPL: 27.7 (ref 7–25)
CALCIUM SPEC-SCNC: 8.8 MG/DL (ref 8.2–9.6)
CHLORIDE SERPL-SCNC: 119 MMOL/L (ref 98–107)
CO2 SERPL-SCNC: 20 MMOL/L (ref 22–29)
CREAT SERPL-MCNC: 2.42 MG/DL (ref 0.76–1.27)
GFR SERPL CREATININE-BSD FRML MDRD: 25 ML/MIN/1.73
GLUCOSE SERPL-MCNC: 77 MG/DL (ref 65–99)
POTASSIUM SERPL-SCNC: 4.1 MMOL/L (ref 3.5–5.2)
SODIUM SERPL-SCNC: 150 MMOL/L (ref 136–145)
WHOLE BLOOD HOLD SPECIMEN: NORMAL

## 2022-02-16 PROCEDURE — 94799 UNLISTED PULMONARY SVC/PX: CPT

## 2022-02-16 PROCEDURE — 80048 BASIC METABOLIC PNL TOTAL CA: CPT | Performed by: HOSPITALIST

## 2022-02-16 PROCEDURE — 63710000001 PREDNISONE PER 5 MG: Performed by: INTERNAL MEDICINE

## 2022-02-16 PROCEDURE — 25010000002 PIPERACILLIN SOD-TAZOBACTAM PER 1 G: Performed by: INTERNAL MEDICINE

## 2022-02-16 RX ADMIN — APIXABAN 2.5 MG: 2.5 TABLET, FILM COATED ORAL at 20:14

## 2022-02-16 RX ADMIN — Medication 3 MG: at 19:41

## 2022-02-16 RX ADMIN — SODIUM CHLORIDE 100 ML/HR: 4.5 INJECTION, SOLUTION INTRAVENOUS at 19:41

## 2022-02-16 RX ADMIN — SODIUM CHLORIDE, PRESERVATIVE FREE 10 ML: 5 INJECTION INTRAVENOUS at 10:10

## 2022-02-16 RX ADMIN — PIPERACILLIN SODIUM AND TAZOBACTAM SODIUM 3.38 G: 3; .375 INJECTION, POWDER, LYOPHILIZED, FOR SOLUTION INTRAVENOUS at 20:14

## 2022-02-16 RX ADMIN — SODIUM CHLORIDE, PRESERVATIVE FREE 10 ML: 5 INJECTION INTRAVENOUS at 20:15

## 2022-02-16 RX ADMIN — FAMOTIDINE 20 MG: 10 INJECTION INTRAVENOUS at 08:27

## 2022-02-16 RX ADMIN — METOPROLOL TARTRATE 12.5 MG: 25 TABLET, FILM COATED ORAL at 20:13

## 2022-02-16 RX ADMIN — ACETAMINOPHEN 650 MG: 325 TABLET, FILM COATED ORAL at 19:41

## 2022-02-16 RX ADMIN — PREDNISONE 3 MG: 1 TABLET ORAL at 08:25

## 2022-02-16 RX ADMIN — APIXABAN 2.5 MG: 2.5 TABLET, FILM COATED ORAL at 08:25

## 2022-02-16 RX ADMIN — SODIUM CHLORIDE 100 ML/HR: 4.5 INJECTION, SOLUTION INTRAVENOUS at 07:35

## 2022-02-16 RX ADMIN — METOPROLOL TARTRATE 12.5 MG: 25 TABLET, FILM COATED ORAL at 08:25

## 2022-02-16 RX ADMIN — SERTRALINE 50 MG: 50 TABLET, FILM COATED ORAL at 08:25

## 2022-02-16 RX ADMIN — PIPERACILLIN SODIUM AND TAZOBACTAM SODIUM 3.38 G: 3; .375 INJECTION, POWDER, LYOPHILIZED, FOR SOLUTION INTRAVENOUS at 08:26

## 2022-02-16 NOTE — PROGRESS NOTES
[facility] Medicine Services  INPATIENT PROGRESS NOTE    Length of Stay: 2  Date of Admission: 2/14/2022  Primary Care Physician: Adolfo Ackerman MD    Subjective   Chief Complaint: Hypoxia  HPI: Awake and alert.    Review of Systems   Unable to perform ROS: Mental status change        All pertinent negatives and positives are as above. All other systems have been reviewed and are negative unless otherwise stated.     Objective    Temp:  [96.6 °F (35.9 °C)-97.3 °F (36.3 °C)] 96.8 °F (36 °C)  Heart Rate:  [] 82  Resp:  [20] 20  BP: (110-138)/(64-81) 118/66    Physical Exam  Vitals reviewed.   Constitutional:       Appearance: He is well-developed.   HENT:      Head: Normocephalic and atraumatic.   Eyes:      Pupils: Pupils are equal, round, and reactive to light.   Cardiovascular:      Rate and Rhythm: Normal rate and regular rhythm.      Heart sounds: Normal heart sounds. No murmur heard.  No friction rub. No gallop.    Pulmonary:      Effort: Pulmonary effort is normal. No respiratory distress.      Breath sounds: Normal breath sounds. No wheezing or rales.   Chest:      Chest wall: No tenderness.   Abdominal:      General: Bowel sounds are normal. There is no distension.      Palpations: Abdomen is soft.      Tenderness: There is no abdominal tenderness.   Musculoskeletal:      Cervical back: Normal range of motion and neck supple.   Psychiatric:         Behavior: Behavior normal.       Results Review:  I have reviewed the labs, radiology results, and diagnostic studies.    Laboratory Data:   Results from last 7 days   Lab Units 02/16/22  1127 02/15/22  0616 02/14/22  1804   SODIUM mmol/L 150* 152* 150*   POTASSIUM mmol/L 4.1 4.6 5.0   CHLORIDE mmol/L 119* 118* 110*   CO2 mmol/L 20.0* 20.0* 19.0*   BUN mg/dL 67* 87* 93*   CREATININE mg/dL 2.42* 3.45* 3.33*   GLUCOSE mg/dL 77 113* 127*   CALCIUM mg/dL 8.8 9.3 11.1*   BILIRUBIN mg/dL  --  0.4 0.5   ALK PHOS  U/L  --  82 100   ALT (SGPT) U/L  --  17 20   AST (SGOT) U/L  --  28 31   ANION GAP mmol/L 11.0 14.0 21.0*     Estimated Creatinine Clearance: 14.8 mL/min (A) (by C-G formula based on SCr of 2.42 mg/dL (H)).  Results from last 7 days   Lab Units 02/15/22  0616 02/14/22  1804   MAGNESIUM mg/dL 2.6* 3.0*         Results from last 7 days   Lab Units 02/15/22  0616 02/14/22  1804   WBC 10*3/mm3 8.51 10.48   HEMOGLOBIN g/dL 14.5 16.9   HEMATOCRIT % 45.3 50.6   PLATELETS 10*3/mm3 192 317           Culture Data:   Blood Culture   Date Value Ref Range Status   02/14/2022 No growth at 24 hours  Preliminary   02/14/2022 No growth at 24 hours  Preliminary     No results found for: URINECX  No results found for: RESPCX  No results found for: WOUNDCX  No results found for: STOOLCX  No components found for: BODYFLD    Radiology Data:   Imaging Results (Last 24 Hours)     ** No results found for the last 24 hours. **          I have reviewed the patient's current medications.     Assessment/Plan     Active Hospital Problems    Diagnosis    • Dehydration    • Acute renal failure (ARF) ([facility])        Plan:    1.  Acute kidney injury: Continue IV fluids.  Renal function slightly worse today.  2.  Atrial fibrillation with rapid ventricular response: Continue Cardizem drip.  3.  Chronic hypoxic respiratory failure: Status post COVID-19.  4.  Cholelithiasis  5.  Renal mass: Per family this will be monitored.  No work-up.  6.  DVT prophylaxis: Eliquis.    Lengthy discussion with patient's daughter regarding patient wishes for the.  At this point we will continue to hydrate in order to correct the renal failure.  Likely they will proceed to transition to some sort of less aggressive plan of care, potentially comfort measures once renal function is normalized.      The patient was evaluated during the global COVID-19 pandemic, and the diagnosis was suspected/considered upon their initial presentation.  Evaluation, treatment, and testing were  consistent with current guidelines for patients who present with complaints or symptoms that may be related to COVID-19.    I confirmed that the patient's Advance Care Plan is present, code status is documented, or surrogate decision maker is listed in the patient's medical record.       Discharge Planning: I expect patient to be discharged to skilled facility in 1-2 days.        This document has been electronically signed by Glen Caro MD on February 16, 2022 16:32 CST

## 2022-02-16 NOTE — SIGNIFICANT NOTE
02/16/22 1015   OTHER   Discipline physical therapist; occupational therapist   Rehab Time/Intention   Session Not Performed other (see comments)   Recommendation   PT - Next Appointment 02/17/22   Recommendation   OT - Next Appointment 02/17/22     Initial PT/OT evaluations attempted x 2.  Per RN, family is considering comfort care for patient.  Currently no family present.  Will await family's decision.

## 2022-02-16 NOTE — PLAN OF CARE
Goal Outcome Evaluation:  Plan of Care Reviewed With: patient, daughter   NC @ 3l bnc, nailbeds dusky, generalized weakness, fall risk,poor po intake. Encourage po intake, frequent turns to prevent skin break down

## 2022-02-16 NOTE — CONSULTS
Adult Nutrition  Assessment    Patient Name:  Jaime Velez  YOB: 1926  MRN: [id number]  Admit Date:  2/14/2022    Assessment Date:  2/16/2022    Comments:  RD consult r/t MST 3 and BMI 18. Pt is 89 y/o nursing home res admitted r/t hypoxia/ARCHANA. Family has not been present when RD attempted to visit. Per med notes, pt w/covid infection last month and has since been in snf. Epic weight records show significant weight loss (26#) in that timeframe. Per med notes, family considering comfort care. RD will monitor POC and make recs/assess for malnutrition (NFPE not appropriate this date) as appropriate.      Reason for Assessment     Row Name 02/16/22 1303          Reason for Assessment    Reason For Assessment per organizational policy     Diagnosis renal disease     Identified At Risk by Screening Criteria BMI; unintentional loss of 10 lbs or more in the past 2 mos; MST SCORE 2+                Nutrition/Diet History     Row Name 02/16/22 130          Nutrition/Diet History    Typical Food/Fluid Intake Pt sleeping soundly each time RD attempted to see. No family was present.                  Labs/Tests/Procedures/Meds     Row Name 02/16/22 1303          Labs/Procedures/Meds    Lab Results Reviewed reviewed, pertinent     Lab Results Comments Na 150, BUN 67, crea 2.42            Diagnostic Tests/Procedures    Diagnostic Test/Procedure Reviewed reviewed            Medications    Pertinent Medications Reviewed reviewed                  Estimated/Assessed Needs     Row Name 02/16/22 1304          Calculation Measurements    Weight Used For Calculations 75.3 kg (166 lb)            Estimated/Assessed Needs    Additional Documentation KCAL/KG (Group); Protein Requirements (Group); Fluid Requirements (Group)            KCAL/KG    KCAL/KG 25 Kcal/Kg (kcal)     25 Kcal/Kg (kcal) 1882.425            Protein Requirements    Weight Used For Protein Calculations 75.3 kg (166 lb)     Est Protein Requirement Amount  (gms/kg) 0.8 gm protein     Estimated Protein Requirements (gms/day) 60.24            Fluid Requirements    Fluid Requirements (mL/day) 1500     RDA Method (mL) 1500                Nutrition Prescription Ordered     Row Name 02/16/22 1310          Nutrition Prescription PO    Current PO Diet Clear Liquid                       Electronically signed by:  Itzel Garrett RD  02/16/22 13:12 CST

## 2022-02-16 NOTE — PLAN OF CARE
Problem: Adult Inpatient Plan of Care  Goal: Plan of Care Review  Outcome: Ongoing, Progressing  Goal: Patient-Specific Goal (Individualized)  Outcome: Ongoing, Progressing  Goal: Absence of Hospital-Acquired Illness or Injury  Outcome: Ongoing, Progressing  Intervention: Identify and Manage Fall Risk  Recent Flowsheet Documentation  Taken 2/15/2022 1907 by Sofia Uribe RN  Safety Promotion/Fall Prevention:   safety round/check completed   fall prevention program maintained  Intervention: Prevent Skin Injury  Recent Flowsheet Documentation  Taken 2/15/2022 1907 by Sofia Uribe RN  Body Position: side-lying, left  Skin Protection: pectin skin barriers applied  Intervention: Prevent Infection  Recent Flowsheet Documentation  Taken 2/15/2022 1907 by Sofia Uribe RN  Infection Prevention:   single patient room provided   rest/sleep promoted   hand hygiene promoted  Goal: Optimal Comfort and Wellbeing  Outcome: Ongoing, Progressing  Goal: Readiness for Transition of Care  Outcome: Ongoing, Progressing     Problem: Fall Injury Risk  Goal: Absence of Fall and Fall-Related Injury  Outcome: Ongoing, Progressing  Intervention: Identify and Manage Contributors to Fall Injury Risk  Recent Flowsheet Documentation  Taken 2/15/2022 1907 by Sofia Uribe RN  Medication Review/Management: medications reviewed  Intervention: Promote Injury-Free Environment  Recent Flowsheet Documentation  Taken 2/15/2022 1907 by Sofia Uribe RN  Safety Promotion/Fall Prevention:   safety round/check completed   fall prevention program maintained     Problem: Skin Injury Risk Increased  Goal: Skin Health and Integrity  Outcome: Ongoing, Progressing  Intervention: Optimize Skin Protection  Recent Flowsheet Documentation  Taken 2/15/2022 1907 by Sofia Uribe RN  Pressure Reduction Techniques: weight shift assistance provided  Head of Bed (HOB): HOB elevated  Pressure Reduction Devices: positioning supports utilized  Skin Protection:  pectin skin barriers applied   Goal Outcome Evaluation:

## 2022-02-17 LAB
ANION GAP SERPL CALCULATED.3IONS-SCNC: 13 MMOL/L (ref 5–15)
BUN SERPL-MCNC: 58 MG/DL (ref 8–23)
BUN/CREAT SERPL: 26.6 (ref 7–25)
CALCIUM SPEC-SCNC: 8.2 MG/DL (ref 8.2–9.6)
CHLORIDE SERPL-SCNC: 118 MMOL/L (ref 98–107)
CO2 SERPL-SCNC: 18 MMOL/L (ref 22–29)
CREAT SERPL-MCNC: 2.18 MG/DL (ref 0.76–1.27)
GFR SERPL CREATININE-BSD FRML MDRD: 28 ML/MIN/1.73
GLUCOSE SERPL-MCNC: 71 MG/DL (ref 65–99)
POTASSIUM SERPL-SCNC: 4.2 MMOL/L (ref 3.5–5.2)
SODIUM SERPL-SCNC: 149 MMOL/L (ref 136–145)
WHOLE BLOOD HOLD SPECIMEN: NORMAL

## 2022-02-17 PROCEDURE — 80048 BASIC METABOLIC PNL TOTAL CA: CPT | Performed by: HOSPITALIST

## 2022-02-17 PROCEDURE — 63710000001 PREDNISONE PER 5 MG: Performed by: INTERNAL MEDICINE

## 2022-02-17 PROCEDURE — 25010000002 PIPERACILLIN SOD-TAZOBACTAM PER 1 G: Performed by: INTERNAL MEDICINE

## 2022-02-17 RX ADMIN — ACETAMINOPHEN 650 MG: 325 TABLET, FILM COATED ORAL at 17:35

## 2022-02-17 RX ADMIN — FAMOTIDINE 20 MG: 10 INJECTION INTRAVENOUS at 08:16

## 2022-02-17 RX ADMIN — SODIUM CHLORIDE 100 ML/HR: 4.5 INJECTION, SOLUTION INTRAVENOUS at 20:49

## 2022-02-17 RX ADMIN — Medication 3 MG: at 20:46

## 2022-02-17 RX ADMIN — SODIUM CHLORIDE, PRESERVATIVE FREE 10 ML: 5 INJECTION INTRAVENOUS at 08:16

## 2022-02-17 RX ADMIN — PIPERACILLIN SODIUM AND TAZOBACTAM SODIUM 3.38 G: 3; .375 INJECTION, POWDER, LYOPHILIZED, FOR SOLUTION INTRAVENOUS at 08:16

## 2022-02-17 RX ADMIN — APIXABAN 2.5 MG: 2.5 TABLET, FILM COATED ORAL at 20:46

## 2022-02-17 RX ADMIN — APIXABAN 2.5 MG: 2.5 TABLET, FILM COATED ORAL at 08:15

## 2022-02-17 RX ADMIN — SODIUM CHLORIDE 100 ML/HR: 4.5 INJECTION, SOLUTION INTRAVENOUS at 12:06

## 2022-02-17 RX ADMIN — METOPROLOL TARTRATE 12.5 MG: 25 TABLET, FILM COATED ORAL at 20:46

## 2022-02-17 RX ADMIN — METOPROLOL TARTRATE 12.5 MG: 25 TABLET, FILM COATED ORAL at 08:15

## 2022-02-17 RX ADMIN — PIPERACILLIN SODIUM AND TAZOBACTAM SODIUM 3.38 G: 3; .375 INJECTION, POWDER, LYOPHILIZED, FOR SOLUTION INTRAVENOUS at 20:47

## 2022-02-17 RX ADMIN — SODIUM CHLORIDE, PRESERVATIVE FREE 10 ML: 5 INJECTION INTRAVENOUS at 20:48

## 2022-02-17 RX ADMIN — PREDNISONE 3 MG: 1 TABLET ORAL at 08:18

## 2022-02-17 RX ADMIN — SERTRALINE 50 MG: 50 TABLET, FILM COATED ORAL at 08:18

## 2022-02-17 NOTE — DISCHARGE PLACEMENT REQUEST
"Jaime Velez (89 y.o. Male)             Date of Birth Social Security Number Address Home Phone MRN    03/22/1926  [id number]  [facility] [id number] [phone] [id number]    [employer] Marital Status             [employer]        Admission Date Admission Type Admitting Provider Attending Provider Department, Room/Bed    2/14/22 Emergency Behroozi, Saeid, MD Williams, Kevin L, MD [address], [address]    Discharge Date Discharge Disposition Discharge Destination                         Attending Provider: Glen Caro MD    Allergies: No Known Allergies    Isolation: None   Infection: COVID (History) (02/15/22)   Code Status: No CPR   Advance Care Planning Activity    Ht: 177.8 cm (70\")   Wt: 57.2 kg (126 lb 1.6 oz)    Admission Cmt: None   Principal Problem: None                Active Insurance as of 2/14/2022     Primary Coverage     Payor Plan Insurance Group Employer/Plan Group    [employer] [place] [id number]     Payor Plan Address Payor Plan Phone Number Payor Plan Fax Number Effective Dates    [facility] OFFICE OF COMMUNITY CARE [phone]  10/30/2004 - None Entered    [address] [address]       [facility] [address]       Subscriber Name Subscriber Birth Date Member ID       JAIME VELEZ 3/22/1926 [id number]                 Emergency Contacts      (Rel.) Home Phone Work Phone Mobile Phone    Bhavani Breaux (Daughter) [phone] -- [phone]            Insurance Information                [employer] Phone: [phone]    Subscriber: Jaime Velez Subscriber#: [id number]    Group#: [id number] Precert#: --          "

## 2022-02-17 NOTE — DISCHARGE PLACEMENT REQUEST
"Alla Mccloud RN   [phone]    Jaime Velez (89 y.o. Male)             Date of Birth Social Security Number Address Home Phone MRN    03/22/1926  [facility]  [facility] [address] [phone] [id number]    Denominational Marital Status             Protestant        Admission Date Admission Type Admitting Provider Attending Provider Department, Room/Bed    2/14/22 Emergency Behroozi, Saeid, MD Williams, Kevin L, MD [address], 312/1    Discharge Date Discharge Disposition Discharge Destination                         Attending Provider: Glen Caro MD    Allergies: No Known Allergies    Isolation: None   Infection: COVID (History) (02/15/22)   Code Status: No CPR   Advance Care Planning Activity    Ht: 177.8 cm (70\")   Wt: 57.2 kg (126 lb 1.6 oz)    Admission Cmt: None   Principal Problem: None                Active Insurance as of 2/14/2022     Primary Coverage     Payor Plan Insurance Group Employer/Plan Group    [employer] [place] [id number]     Payor Plan Address Payor Plan Phone Number Payor Plan Fax Number Effective Dates    [facility] OFFICE OF COMMUNITY CARE [phone]  10/30/2004 - None Entered    [address] [address]       [facility] [address]       Subscriber Name Subscriber Birth Date Member ID       JAIME VELEZ 3/22/1926 [id number]                 Emergency Contacts      (Rel.) Home Phone Work Phone Mobile Phone    Bhavani Breaux (Daughter) [phone] -- [phone]            Insurance Information                [employer] [employer] 111 Phone: [phone]    Subscriber: Jaime Velez Subscriber#: [id number]    Group#: [id number] Precert#: --             History & Physical      Behroozi, Saeid, MD at 02/14/22 [facility]                [facility] Medicine Admission      Date of Admission: 2/14/2022      Primary Care Physician: Adolfo Ackerman MD      Chief Complaint: Low oxygen " level    HPI:  Patient is a 89-year-old chronically ill looking male, resident of skilled nursing facility for about 3 weeks following COVID-19 infection and deconditioning and development of hypoxemia and O2 dependency at that time as history of chronic kidney disease a stage III hypertension hyperlipidemia paroxysmal atrial fibrillation on Eliquis, pneumonia, pacemaker placement who was sent from local nursing facility to ED concerning low oxygen number and reported abdominal pain.  Patient had A. fib with RVR in ED was given diltiazem 10 mg IV.  Patient had abnormal CT of the abdomen and pelvis as abnormal laboratory work-up.  Hospitalist service was called for admission of the patient.  Patient was seen and examined in ED room 12.  His daughter Bhavani, and Bhavani's  at bedside.  Patient denies any particular complaint.  Mainly talks about his bowel habit.  Per daughter he has chronic diarrhea.  He had weight loss over several weeks and months.  He has been on supplemental O2 2 L for several weeks.  He has been debilitated and deconditioned and has not been able to do much physical therapy nursing home.  He may had 2 falls in assisted living in a nursing home over the last several weeks.  He may had decreased p.o. intake recently.  Patient denies any particular complaint including no headache sore throat chest pain shortness of breath abdominal pain back pain, difficulty urinating.     Concurrent Medical History:  has a past medical history of Bradycardia, Chronic renal failure, stage 3 (moderate) (HCC), Depression, Hypertension, Hypothyroidism, Paroxysmal atrial fibrillation (HCC), and Pneumonia.    Past Surgical History:  has a past surgical history that includes Appendectomy; Prostate surgery; Pacemaker Implantation; Back surgery; Prostate surgery; Insert / replace / remove pacemaker; Cataract extraction, bilateral; and Cardiac electrophysiology procedure (N/A, 11/19/2020).    Family History: family history  includes No Known Problems in his father and mother.     Social History:  reports that he has quit smoking. He has never used smokeless tobacco. He reports that he does not drink alcohol and does not use drugs.    Allergies: No Known Allergies    Medications:   Prior to Admission medications    Medication Sig Start Date End Date Taking? Authorizing Provider   apixaban (ELIQUIS) 2.5 MG tablet tablet Take 2.5 mg by mouth 2 (Two) Times a Day.    Nancy Macias MD   Calcium Carb-Cholecalciferol (OYSTER SHELL CALCIUM/VITAMIN D) 250-125 MG-UNIT tablet tablet Take  by mouth 2 (Two) Times a Day.       dilTIAZem CD (CARDIZEM CD) 180 MG 24 hr capsule Take 1 capsule by mouth Daily. 12/16/20   Marcos Barba MD   metoprolol tartrate (LOPRESSOR) 25 MG tablet Take 12.5 mg by mouth 2 (Two) Times a Day.    Nancy Macias MD   Multiple Vitamins-Minerals (MULTIVITAMIN PO) Take  by mouth.    Nancy Macias MD   ondansetron ODT (ZOFRAN-ODT) 4 MG disintegrating tablet Place 1 tablet on the tongue Every 6 (Six) Hours As Needed for Nausea or Vomiting. 1/18/22   Paul Felix MD   predniSONE (DELTASONE) 2.5 MG tablet Take 3 mg by mouth Daily.    Nancy Macias MD   pseudoephedrine-guaifenesin (MUCINEX D)  MG per 12 hr tablet Take 1 tablet by mouth Every 12 (Twelve) Hours. 1/18/22   Paul Felix MD   sertraline (ZOLOFT) 50 MG tablet Take 50 mg by mouth Daily.    Nancy Macias MD       Review of Systems:  Review of Systems   Constitutional: Positive for activity change, appetite change and fatigue. Negative for chills, diaphoresis and fever.   HENT: Negative for congestion, dental problem, ear pain, facial swelling, rhinorrhea and sinus pressure.    Eyes: Negative for photophobia, discharge, redness, itching and visual disturbance.   Respiratory: Positive for shortness of breath. Negative for apnea, cough, choking, chest tightness, wheezing and stridor.    Cardiovascular: Negative  for chest pain, palpitations and leg swelling.   Gastrointestinal: Positive for diarrhea. Negative for abdominal distention, abdominal pain, anal bleeding, blood in stool, nausea, rectal pain and vomiting.   Endocrine: Negative for cold intolerance, heat intolerance, polydipsia, polyphagia and polyuria.   Genitourinary: Negative for difficulty urinating, flank pain, frequency, hematuria and urgency.   Musculoskeletal: Negative for arthralgias, back pain, joint swelling and myalgias.   Skin: Negative for pallor, rash and wound.   Allergic/Immunologic: Negative for environmental allergies and immunocompromised state.   Neurological: Negative for dizziness, tremors, seizures, facial asymmetry, speech difficulty, weakness, light-headedness, numbness and headaches.   Hematological: Negative for adenopathy. Does not bruise/bleed easily.   Psychiatric/Behavioral: Negative for agitation, behavioral problems and hallucinations. The patient is not nervous/anxious.       Otherwise complete ROS is negative except as mentioned above.  Review of system is not obtainable from the patient in particular.  Review of system was discussed with his daughter, therefore the value of obtained information for review of system is limited.    Physical Exam:   Temp:  [97.3 °F (36.3 °C)] 97.3 °F (36.3 °C)  Heart Rate:  [104-122] 122  Resp:  [18] 18  BP: (110-171)/(63-88) 114/75  Physical Exam  Constitutional:       General: He is not in acute distress.     Appearance: He is not ill-appearing, toxic-appearing or diaphoretic.      Comments: Slim, chronic ill-looking elderly male   HENT:      Head: Normocephalic and atraumatic.      Right Ear: External ear normal.      Left Ear: External ear normal.      Nose: Nose normal.      Mouth/Throat:      Mouth: Mucous membranes are moist.      Pharynx: Oropharynx is clear.   Eyes:      Extraocular Movements: Extraocular movements intact.      Conjunctiva/sclera: Conjunctivae normal.      Pupils: Pupils  are equal, round, and reactive to light.   Cardiovascular:      Rate and Rhythm: Tachycardia present. Rhythm irregular.      Heart sounds: Murmur heard.   No friction rub. No gallop.    Pulmonary:      Effort: No respiratory distress.      Breath sounds: No stridor. No wheezing or rales.   Chest:      Chest wall: No tenderness.   Abdominal:      General: Abdomen is flat. There is no distension.      Palpations: Abdomen is soft.      Tenderness: There is no abdominal tenderness. There is no guarding or rebound.   Musculoskeletal:         General: No swelling or tenderness.      Cervical back: No rigidity or tenderness.      Right lower leg: No edema.      Left lower leg: No edema.      Comments: Decreased muscular mass   Lymphadenopathy:      Cervical: No cervical adenopathy.   Skin:     General: Skin is warm and dry.      Coloration: Skin is not jaundiced.      Findings: No erythema.   Neurological:      Mental Status: He is alert. Mental status is at baseline.      Sensory: No sensory deficit.      Motor: No weakness.      Coordination: Coordination normal.      Comments: Oriented to person and place   Psychiatric:         Mood and Affect: Mood normal.         Behavior: Behavior normal.         Judgment: Judgment normal.           Results Reviewed:  I have personally reviewed current lab, radiology, and data and agree with results.  Lab Results (last 24 hours)     Procedure Component Value Units Date/Time    Extra Tubes [[id number]] Collected: 02/14/22 1804    Specimen: Blood Updated: 02/14/22 2215    Narrative:      The following orders were created for panel order Extra Tubes.  Procedure                               Abnormality         Status                     ---------                               -----------         ------                     [facility][id number]                                   Final result               [place] Blood Culture Dionisio...[[id number]]                      Final result                [place] Top[[id number]]                                         Final result               Light Blue Top[[id number]]                                   Final result                 Please view results for these tests on the individual orders.    Gray Top [[id number]] Collected: 02/14/22 1804    Specimen: Blood Updated: 02/14/22 2215     Extra Tube Hold for add-ons.     Comment: Auto resulted.       STAT Lactic Acid, Reflex [[id number]]  (Abnormal) Collected: 02/14/22 2129    Specimen: Blood Updated: 02/14/22 2149     Lactate 4.5 mmol/L     Blood Gas, Arterial - [[id number]]  (Abnormal) Collected: 02/14/22 2107    Specimen: Arterial Blood Updated: 02/14/22 2114     Site Left Radial     Allan's Test N/A     pH, Arterial 7.549 pH units      Comment: 83 Value above reference range        pCO2, Arterial 14.6 mm Hg      Comment: 85 Value below critical limit        pO2, Arterial 147.0 mm Hg      Comment: 83 Value above reference range        HCO3, Arterial 12.7 mmol/L      Comment: 84 Value below reference range        Base Excess, Arterial -5.8 mmol/L      Comment: 84 Value below reference range        O2 Saturation, Arterial 99.7 %      Comment: 83 Value above reference range        Barometric Pressure for Blood Gas 756 mmHg      Modality Room Air     Ventilator Mode NA     Collected by [email or web address]     Comment: Meter: [id number]     :  [id number]       Blood Culture - Blood, Arm, Left [[id number]] Collected: 02/14/22 1943    Specimen: Blood from Arm, Left Updated: 02/14/22 1945    Blood Culture - Blood, Arm, Right [[id number]] Collected: 02/14/22 1814    Specimen: Blood from Arm, Right Updated: 02/14/22 1927    Troponin [[id number]]  (Abnormal) Collected: 02/14/22 1804    Specimen: Blood Updated: 02/14/22 1922     Troponin T 0.080 ng/mL     Narrative:      Troponin T Reference Range:  <= 0.03 ng/mL-   Negative for AMI  >0.03 ng/mL-     Abnormal for myocardial necrosis.  Clinicians would have to utilize clinical  acumen, EKG, Troponin and serial changes to determine if it is an Acute Myocardial Infarction or myocardial injury due to an underlying chronic condition.       Results may be falsely decreased if patient taking Biotin.      Comprehensive Metabolic Panel [[id number]]  (Abnormal) Collected: 02/14/22 1804    Specimen: Blood Updated: 02/14/22 1915     Glucose 127 mg/dL      BUN 93 mg/dL      Creatinine 3.33 mg/dL      Sodium 150 mmol/L      Potassium 5.0 mmol/L      Chloride 110 mmol/L      CO2 19.0 mmol/L      Calcium 11.1 mg/dL      Total Protein 8.1 g/dL      Albumin 4.50 g/dL      ALT (SGPT) 20 U/L      AST (SGOT) 31 U/L      Alkaline Phosphatase 100 U/L      Total Bilirubin 0.5 mg/dL      eGFR Non African Amer 17 mL/min/1.73      Globulin 3.6 gm/dL      A/G Ratio 1.3 g/dL      BUN/Creatinine Ratio 27.9     Anion Gap 21.0 mmol/L     Narrative:      GFR Normal >60  Chronic Kidney Disease <60  Kidney Failure <15      Lipase [[id number]]  (Abnormal) Collected: 02/14/22 1804    Specimen: Blood Updated: 02/14/22 1915     Lipase 296 U/L     Magnesium [[id number]]  (Abnormal) Collected: 02/14/22 1804    Specimen: Blood Updated: 02/14/22 1915     Magnesium 3.0 mg/dL     Gold Top - SST [[id number]] Collected: 02/14/22 1804    Specimen: Blood Updated: 02/14/22 1915     Extra Tube Hold for add-ons.     Comment: Auto resulted.       Light Blue Top [[id number]] Collected: 02/14/22 1804    Specimen: Blood Updated: 02/14/22 1915     Extra Tube hold for add-on     Comment: Auto resulted       [place] Blood Culture Bottle Set [[id number]] Collected: 02/14/22 1804    Specimen: Blood Updated: 02/14/22 1915     Extra Tube Hold for add-ons.     Comment: Auto resulted.       BNP [[id number]]  (Abnormal) Collected: 02/14/22 1804    Specimen: Blood Updated: 02/14/22 1913     proBNP 8,394.0 pg/mL     Narrative:      Among patients with dyspnea, NT-proBNP is highly sensitive for the detection of acute congestive heart failure. In addition  NT-proBNP of <300 pg/ml effectively rules out acute congestive heart failure with 99% negative predictive value.    Results may be falsely decreased if patient taking Biotin.      Lactic Acid, Plasma [[id number]]  (Abnormal) Collected: 02/14/22 1804    Specimen: Blood Updated: 02/14/22 1842     Lactate 3.5 mmol/L     D-dimer, Quantitative [[id number]]  (Abnormal) Collected: 02/14/22 1804    Specimen: Blood Updated: 02/14/22 1831     D-Dimer, Quantitative 514 ng/mL (FEU)     Narrative:      Dimer values <500 ng/ml FEU are FDA approved as aid in diagnosis of deep venous thrombosis and pulmonary embolism.  This test should not be used in an exclusion strategy with pretest probability alone.    A recent guideline regarding diagnosis for pulmonary thromboembolism recommends an adjusted exclusion criterion of age x 10 ng/ml FEU for patients >50 years of age ([employer] Intern Med 2015; 163: 701-711).      CBC & Differential [[id number]]  (Abnormal) Collected: 02/14/22 1804    Specimen: Blood Updated: 02/14/22 1809    Narrative:      The following orders were created for panel order CBC & Differential.  Procedure                               Abnormality         Status                     ---------                               -----------         ------                     CBC Auto Differential[[id number]]        Abnormal            Final result                 Please view results for these tests on the individual orders.    CBC Auto Differential [[id number]]  (Abnormal) Collected: 02/14/22 1804    Specimen: Blood Updated: 02/14/22 1809     WBC 10.48 10*3/mm3      RBC 5.66 10*6/mm3      Hemoglobin 16.9 g/dL      Hematocrit 50.6 %      MCV 89.4 fL      MCH 29.9 pg      MCHC 33.4 g/dL      RDW 14.5 %      RDW-SD 46.4 fl      MPV 11.3 fL      Platelets 317 10*3/mm3      Neutrophil % 85.0 %      Lymphocyte % 7.3 %      Monocyte % 5.9 %      Eosinophil % 0.3 %      Basophil % 0.6 %      Immature Grans % 0.9 %      Neutrophils, Absolute  8.91 10*3/mm3      Lymphocytes, Absolute 0.77 10*3/mm3      Monocytes, Absolute 0.62 10*3/mm3      Eosinophils, Absolute 0.03 10*3/mm3      Basophils, Absolute 0.06 10*3/mm3      Immature Grans, Absolute 0.09 10*3/mm3      nRBC 0.0 /100 WBC         Imaging Results (Last 24 Hours)     Procedure Component Value Units Date/Time    CT Abdomen Pelvis Without Contrast [[id number]] Collected: 02/14/22 2209     Updated: 02/14/22 2234    Narrative:      EXAM: CT ABDOMEN PELVIS WO CONTRAST  RPID: CT ABDOMEN PELVIS WITHOUT IV CONTRAST    CLINICAL INDICATION: 89 years  old Male with history of elevated  lipase, ARCHANA, abd pain, elevated lactic acid    COMPARISON: None    TECHNIQUE: CT abdomen and pelvis were performed without IV  contrast. From this data, sagittal and coronal reconstructed  images were generated.  RADIATION DOSE REDUCTION: This exam was performed according to  the departmental dose-optimization program, including automated  exposure control, adjustment of the mA and kV according to  patient size, and iterative reconstruction technique; if  available.    FINDINGS:  LUNG BASES: Cardiac pacing leads are noted. No infiltrate is  seen. There are granulomatous right middle lobe. The descending  thoracic aorta is tortuous.    CT ABDOMEN: The gallbladder is markedly distended. A calcified  stone is observed. Correlate clinically for the potential of  cholecystitis. A subcapsular 1.8 x 1.2 cm cyst is observed in the  left lobe of the liver.  There is a mass projecting from the lower pole of the left kidney  6.2 x 5.6 x 6.6 cm. This is of heterogeneous density and is  suspicious for a renal cell carcinoma. Confirmation solid  characteristics with sonography should be made. Ultimately a  contrast-enhanced cross-sectional study may be required.    No bowel wall segments appear thickened or inflamed. Multiple  diverticula are noted.    There is diffuse atherosclerotic plaquing throughout the  aortoiliac system.      CT PELVIS:  No free fluid is seen in the pelvis.    Diffuse degenerative changes are seen throughout the  thoracolumbar spine. No skeletal lytic lesions are observed.      Impression:        1. Marked distention of the gallbladder with a gallstone present.  Correlate clinically for cholecystitis.  2. There is a large mass projecting inferiorly from the lower  pole of the left kidney, nonenhanced imaging features are  suspicious for renal cell carcinoma. Consider correlation with a  cross-sectional study if renal function allows or ultrasound.    Electronically signed by:  Calvin Amaya MD  2/14/2022 10:32 PM CST  Workstation: [id number]    XR Chest 1 View [[id number]] Collected: 02/14/22 2202     Updated: 02/14/22 2227    Narrative:      XR CHEST 1 VW  RPID: XR CHEST 1 VIEW  2/14/2022 10:20 PM CST    CLINICAL HISTORY:  89 years old Male with elevated trop, short of breath, elevated  trop    COMPARISON:  none  Number of views: 1  FINDINGS:  The cardiomediastinal silhouette is normal. The lungs are clear.  Pulmonary vascularity is normal. No pleural fluid is seen.    A cardiac pacer is noted on the left. Moderate aortic arch  calcifications are seen.      Impression:      No acute cardiothoracic process is observed.    Electronically signed by:  Calvin Amaya MD  2/14/2022 10:25 PM CST  Workstation: [id number]            Assessment:    Active Hospital Problems    Diagnosis    • Dehydration      # Sepsis, sepsis present on admission cannot be excluded considering elevated lactic acid level  # Atrial fibrillation with rapid ventricular response   # Dehydration  # Hyernatremia possible secondary to free water deficit  # Acute renal failure with metabolic acidosis  Elevated lactic acid level  # Chronic hypoxemic respiratory failure with  status post COVID-19 infection  # Potential left renal mass  # Elevated proBNP level nonspecific   # Gallbladder distention with gallstone          Plan:  Place on telemetry  Supplemental O2,  noninvasive positive pressure ventilation as needed.  Placed on Cardizem drip for control of  heart rate.  Placed on IV fluid  Placed on broad-spectrum IV antibiotic Zosyn  Avoid electrolyte abnormalities  See response of hyponatremia and acute renal failure to IV fluid.  Obtain kidney ultrasound  Comorbidities will be treated appropriately  Further decision making is depending on response to above care  Please see orders for comprehensive plan.  Prognosis guarded in best case scenario.    I confirmed that the patient's Advance Care Plan is present, code status is documented, or surrogate decision maker is listed in the patient's medical record.   Discussed advanced directive and code the status with patient and his daughter.  Patient is DNR/DNI.  Her daughter Bhavani Breaux is his healthcare proxy.    I have utilized all available immediate resources to obtain, update, or review the patient's current medications.     I discussed the patient's findings and my recommendations with: Patient and her daughter at bedside both agreed with above plan of care.      Saeid Behroozi, MD   02/14/22   23:34 CST                Electronically signed by Behroozi, Saeid, MD at 02/15/22 0715         Current Facility-Administered Medications   Medication Dose Route Frequency Provider Last Rate Last Admin   • acetaminophen (TYLENOL) tablet 650 mg  650 mg Oral Q6H PRN Behroozi, Saeid, MD   650 mg at 02/16/22 1941   • apixaban (ELIQUIS) tablet 2.5 mg  2.5 mg Oral Q12H Behroozi, Saeid, MD   2.5 mg at 02/17/22 0815   • dilTIAZem (CARDIZEM) 100 mg in 100 mL NS infusion (ADV)  5-15 mg/hr Intravenous Titrated Behroozi, Saeid, MD   Held at 02/15/22 0431   • famotidine (PEPCID) injection 20 mg  20 mg Intravenous Daily Behroozi, Saeid, MD   20 mg at 02/17/22 0816   • melatonin tablet 3 mg  3 mg Oral Nightly PRN Behroozi, Saeid, MD   3 mg at 02/16/22 1941   • metoprolol tartrate (LOPRESSOR) half tablet 12.5 mg  12.5 mg Oral Q12H Behroozi, Saeid,  MD   12.5 mg at 02/17/22 0815   • piperacillin-tazobactam (ZOSYN) 3.375 g/100 mL 0.9% NS IVPB (mbp)  3.375 g Intravenous Q12H Behroozi, Saeid, MD   Currently Infusing at 02/17/22 1103   • predniSONE (DELTASONE) tablet 3 mg  3 mg Oral Daily Behroozi, Saeid, MD   3 mg at 02/17/22 0818   • sertraline (ZOLOFT) tablet 50 mg  50 mg Oral Daily Behroozi, Saeid, MD   50 mg at 02/17/22 0818   • sodium chloride 0.45 % infusion  100 mL/hr Intravenous Continuous Behroozi, Saeid,  mL/hr at 02/17/22 1206 100 mL/hr at 02/17/22 1206   • sodium chloride 0.9 % flush 10 mL  10 mL Intravenous PRN Venu Caro, DO       • sodium chloride 0.9 % flush 10 mL  10 mL Intravenous Q12H Behroozi, Saeid, MD   10 mL at 02/17/22 0816   • sodium chloride 0.9 % flush 10 mL  10 mL Intravenous PRN Behroozi, Saeid, MD            Physician Progress Notes (last 24 hours)      Glen Caro MD at 02/16/22 1632              [facility] Medicine Services  INPATIENT PROGRESS NOTE    Length of Stay: 2  Date of Admission: 2/14/2022  Primary Care Physician: Adolfo Ackerman MD    Subjective   Chief Complaint: Hypoxia  HPI: Awake and alert.    Review of Systems   Unable to perform ROS: Mental status change        All pertinent negatives and positives are as above. All other systems have been reviewed and are negative unless otherwise stated.     Objective    Temp:  [96.6 °F (35.9 °C)-97.3 °F (36.3 °C)] 96.8 °F (36 °C)  Heart Rate:  [] 82  Resp:  [20] 20  BP: (110-138)/(64-81) 118/66    Physical Exam  Vitals reviewed.   Constitutional:       Appearance: He is well-developed.   HENT:      Head: Normocephalic and atraumatic.   Eyes:      Pupils: Pupils are equal, round, and reactive to light.   Cardiovascular:      Rate and Rhythm: Normal rate and regular rhythm.      Heart sounds: Normal heart sounds. No murmur heard.  No friction rub. No gallop.    Pulmonary:      Effort: Pulmonary effort is  normal. No respiratory distress.      Breath sounds: Normal breath sounds. No wheezing or rales.   Chest:      Chest wall: No tenderness.   Abdominal:      General: Bowel sounds are normal. There is no distension.      Palpations: Abdomen is soft.      Tenderness: There is no abdominal tenderness.   Musculoskeletal:      Cervical back: Normal range of motion and neck supple.   Psychiatric:         Behavior: Behavior normal.       Results Review:  I have reviewed the labs, radiology results, and diagnostic studies.    Laboratory Data:   Results from last 7 days   Lab Units 02/16/22  1127 02/15/22  0616 02/14/22  1804   SODIUM mmol/L 150* 152* 150*   POTASSIUM mmol/L 4.1 4.6 5.0   CHLORIDE mmol/L 119* 118* 110*   CO2 mmol/L 20.0* 20.0* 19.0*   BUN mg/dL 67* 87* 93*   CREATININE mg/dL 2.42* 3.45* 3.33*   GLUCOSE mg/dL 77 113* 127*   CALCIUM mg/dL 8.8 9.3 11.1*   BILIRUBIN mg/dL  --  0.4 0.5   ALK PHOS U/L  --  82 100   ALT (SGPT) U/L  --  17 20   AST (SGOT) U/L  --  28 31   ANION GAP mmol/L 11.0 14.0 21.0*     Estimated Creatinine Clearance: 14.8 mL/min (A) (by C-G formula based on SCr of 2.42 mg/dL (H)).  Results from last 7 days   Lab Units 02/15/22  0616 02/14/22  1804   MAGNESIUM mg/dL 2.6* 3.0*         Results from last 7 days   Lab Units 02/15/22  0616 02/14/22  1804   WBC 10*3/mm3 8.51 10.48   HEMOGLOBIN g/dL 14.5 16.9   HEMATOCRIT % 45.3 50.6   PLATELETS 10*3/mm3 192 317           Culture Data:   Blood Culture   Date Value Ref Range Status   02/14/2022 No growth at 24 hours  Preliminary   02/14/2022 No growth at 24 hours  Preliminary     No results found for: URINECX  No results found for: RESPCX  No results found for: WOUNDCX  No results found for: STOOLCX  No components found for: BODYFLD    Radiology Data:   Imaging Results (Last 24 Hours)     ** No results found for the last 24 hours. **          I have reviewed the patient's current medications.     Assessment/Plan     Active Hospital Problems    Diagnosis     • Dehydration    • Acute renal failure (ARF) ([facility])        Plan:    1.  Acute kidney injury: Continue IV fluids.  Renal function slightly worse today.  2.  Atrial fibrillation with rapid ventricular response: Continue Cardizem drip.  3.  Chronic hypoxic respiratory failure: Status post COVID-19.  4.  Cholelithiasis  5.  Renal mass: Per family this will be monitored.  No work-up.  6.  DVT prophylaxis: Eliquis.    Lengthy discussion with patient's daughter regarding patient wishes for the.  At this point we will continue to hydrate in order to correct the renal failure.  Likely they will proceed to transition to some sort of less aggressive plan of care, potentially comfort measures once renal function is normalized.      The patient was evaluated during the global COVID-19 pandemic, and the diagnosis was suspected/considered upon their initial presentation.  Evaluation, treatment, and testing were consistent with current guidelines for patients who present with complaints or symptoms that may be related to COVID-19.    I confirmed that the patient's Advance Care Plan is present, code status is documented, or surrogate decision maker is listed in the patient's medical record.       Discharge Planning: I expect patient to be discharged to skilled facility in 1-2 days.        This document has been electronically signed by Glen Caro MD on February 16, 2022 16:32 CST        Electronically signed by Glen Caro MD at 02/16/22 2262        (2) probably inadequate

## 2022-02-17 NOTE — SIGNIFICANT NOTE
02/17/22 0951   OTHER   Discipline occupational therapist; physical therapist   Rehab Time/Intention   Session Not Performed other (see comments)   OT/PT eval attempted. Per RN, pt family has decided to go comfort care and interested in setting up hospice. Will d/c therapy orders at this time.

## 2022-02-17 NOTE — PLAN OF CARE
Goal Outcome Evaluation:  Plan of Care Reviewed With: patient, daughter      Awake,confused,[place]. Total care.Hospice to see patient 2/18/22 @ approx 11am

## 2022-02-17 NOTE — PLAN OF CARE
Problem: Adult Inpatient Plan of Care  Goal: Plan of Care Review  Outcome: Ongoing, Progressing  Goal: Patient-Specific Goal (Individualized)  Outcome: Ongoing, Progressing  Goal: Absence of Hospital-Acquired Illness or Injury  Outcome: Ongoing, Progressing  Intervention: Identify and Manage Fall Risk  Recent Flowsheet Documentation  Taken 2/16/2022 1900 by Sofia Uribe RN  Safety Promotion/Fall Prevention:   safety round/check completed   fall prevention program maintained  Intervention: Prevent Skin Injury  Recent Flowsheet Documentation  Taken 2/16/2022 1900 by Sofia Uribe RN  Body Position: side-lying, left  Skin Protection: pectin skin barriers applied  Intervention: Prevent Infection  Recent Flowsheet Documentation  Taken 2/16/2022 1900 by Sofia Uribe RN  Infection Prevention:   single patient room provided   rest/sleep promoted  Goal: Optimal Comfort and Wellbeing  Outcome: Ongoing, Progressing  Goal: Readiness for Transition of Care  Outcome: Ongoing, Progressing     Problem: Fall Injury Risk  Goal: Absence of Fall and Fall-Related Injury  Outcome: Ongoing, Progressing  Intervention: Identify and Manage Contributors to Fall Injury Risk  Recent Flowsheet Documentation  Taken 2/16/2022 1900 by Sofia Uribe RN  Medication Review/Management: medications reviewed  Intervention: Promote Injury-Free Environment  Recent Flowsheet Documentation  Taken 2/16/2022 1900 by Sofia Uribe RN  Safety Promotion/Fall Prevention:   safety round/check completed   fall prevention program maintained     Problem: Skin Injury Risk Increased  Goal: Skin Health and Integrity  Outcome: Ongoing, Progressing  Intervention: Optimize Skin Protection  Recent Flowsheet Documentation  Taken 2/16/2022 1900 by Sofia Uribe RN  Pressure Reduction Techniques: weight shift assistance provided  Head of Bed (HOB): HOB elevated  Pressure Reduction Devices: positioning supports utilized  Skin Protection: pectin skin barriers  applied   Goal Outcome Evaluation:

## 2022-02-17 NOTE — PLAN OF CARE
Goal Outcome Evaluation:  Plan of Care Reviewed With: patient, daughter         Awake and alert with confusion. [place].decreased po intake. IV infusing without difficulty

## 2022-02-18 LAB
ANION GAP SERPL CALCULATED.3IONS-SCNC: 14 MMOL/L (ref 5–15)
BUN SERPL-MCNC: 43 MG/DL (ref 8–23)
BUN/CREAT SERPL: 22.6 (ref 7–25)
CALCIUM SPEC-SCNC: 8.1 MG/DL (ref 8.2–9.6)
CHLORIDE SERPL-SCNC: 115 MMOL/L (ref 98–107)
CO2 SERPL-SCNC: 15 MMOL/L (ref 22–29)
CREAT SERPL-MCNC: 1.9 MG/DL (ref 0.76–1.27)
GFR SERPL CREATININE-BSD FRML MDRD: 33 ML/MIN/1.73
GLUCOSE SERPL-MCNC: 79 MG/DL (ref 65–99)
POTASSIUM SERPL-SCNC: 3.8 MMOL/L (ref 3.5–5.2)
SODIUM SERPL-SCNC: 144 MMOL/L (ref 136–145)

## 2022-02-18 PROCEDURE — 63710000001 PREDNISONE PER 5 MG: Performed by: INTERNAL MEDICINE

## 2022-02-18 PROCEDURE — 80048 BASIC METABOLIC PNL TOTAL CA: CPT | Performed by: HOSPITALIST

## 2022-02-18 PROCEDURE — 25010000002 PIPERACILLIN SOD-TAZOBACTAM PER 1 G: Performed by: INTERNAL MEDICINE

## 2022-02-18 RX ORDER — LORAZEPAM 0.5 MG/1
0.25 TABLET ORAL ONCE
Status: COMPLETED | OUTPATIENT
Start: 2022-02-18 | End: 2022-02-18

## 2022-02-18 RX ORDER — MORPHINE SULFATE 100 MG/5ML
5 SOLUTION ORAL
Qty: 30 ML | Refills: 0 | Status: SHIPPED | OUTPATIENT
Start: 2022-02-18

## 2022-02-18 RX ORDER — LORAZEPAM 2 MG/ML
0.5 CONCENTRATE ORAL
Qty: 30 ML | Refills: 0 | Status: SHIPPED | OUTPATIENT
Start: 2022-02-18

## 2022-02-18 RX ORDER — MORPHINE SULFATE 100 MG/5ML
5 SOLUTION ORAL
Qty: 15 ML | Refills: 0 | Status: SHIPPED | OUTPATIENT
Start: 2022-02-18 | End: 2022-02-18 | Stop reason: SDUPTHER

## 2022-02-18 RX ADMIN — SODIUM CHLORIDE 100 ML/HR: 4.5 INJECTION, SOLUTION INTRAVENOUS at 16:12

## 2022-02-18 RX ADMIN — SERTRALINE 50 MG: 50 TABLET, FILM COATED ORAL at 08:09

## 2022-02-18 RX ADMIN — PREDNISONE 3 MG: 1 TABLET ORAL at 08:09

## 2022-02-18 RX ADMIN — PIPERACILLIN SODIUM AND TAZOBACTAM SODIUM 3.38 G: 3; .375 INJECTION, POWDER, LYOPHILIZED, FOR SOLUTION INTRAVENOUS at 21:05

## 2022-02-18 RX ADMIN — SODIUM CHLORIDE, PRESERVATIVE FREE 10 ML: 5 INJECTION INTRAVENOUS at 08:10

## 2022-02-18 RX ADMIN — METOPROLOL TARTRATE 12.5 MG: 25 TABLET, FILM COATED ORAL at 20:56

## 2022-02-18 RX ADMIN — APIXABAN 2.5 MG: 2.5 TABLET, FILM COATED ORAL at 08:08

## 2022-02-18 RX ADMIN — SODIUM CHLORIDE, PRESERVATIVE FREE 10 ML: 5 INJECTION INTRAVENOUS at 20:56

## 2022-02-18 RX ADMIN — APIXABAN 2.5 MG: 2.5 TABLET, FILM COATED ORAL at 20:56

## 2022-02-18 RX ADMIN — LORAZEPAM 0.25 MG: 0.5 TABLET ORAL at 21:02

## 2022-02-18 RX ADMIN — FAMOTIDINE 20 MG: 10 INJECTION INTRAVENOUS at 08:09

## 2022-02-18 RX ADMIN — METOPROLOL TARTRATE 12.5 MG: 25 TABLET, FILM COATED ORAL at 08:09

## 2022-02-18 NOTE — PROGRESS NOTES
"Physicians Statement of Medical Necessity for  Ambulance Transportation    GENERAL INFORMATION     Name: Jaime Velez  YOB: 1926  Medicare #: N/A  Transport Date: 2/20/2022 (Valid for round trips this date, or for scheduled repetitive trips for 60 days from the date signed below.)  Origin: [facility] [address]  Destination: [address] [place] [address]  Is the Patient's stay covered under Medicare Part A (PPS/DRG?)No   Closest appropriate facility? Yes  If this a hosp-hosp transfer? No  Is this a hospice patient? Yes, Is this transport related to patient's terminal illness? Yes [facility]    MEDICAL NECESSITY QUESTIONAIRE    Ambulance Transportation is medically necessary only if other means of transportation are contraindicated or would be potentially harmful to the patient.  To meet this requirement, the patient must be either \"bed confined\" or suffer from a condition such that transport by means other than an ambulance is contraindicated by the patient's condition.  The following questions must be answered by the healthcare professional signing below for this form to be valid:     1) Describe the MEDICAL CONDITION (physical and/or mental) of this patient AT THE TIME OF AMBULANCE TRANSPORT that requires the patient to be transported in an ambulance, and why transport by other means is contraindicated by the patient's condition:     Patient has been admitted for ARCHANA and respiratory failure. Patient has renal mass and altered mental status. Patient is dc home with hospice for end of life care secondary to renal mass, mental status change.  Patient is requiring pain medication for restlessness and to remain comfort measures. Patient is requiring 3L 02.    Past Medical History:   Diagnosis Date   • Bradycardia    • Chronic renal failure, stage 3 (moderate) (HCC)    • Depression    • Hypertension    • Hypothyroidism    • Paroxysmal atrial fibrillation (HCC)    • " "Pneumonia         2) Is this patient \"bed confined\" as defined below?Yes   To be \"bed confined\" the patient must satisfy all three of the following criteria:  (1) unable to get up from bed without assistance; AND (2) unable to ambulate;  AND (3) unable to sit in a chair or wheelchair.  3) Can this patient safely be transported by car or wheelchair van (I.e., may safely sit during transport, without an attendant or monitoring?)No   4. In addition to completing questions 1-3 above, please check any of the following conditions that apply*:          *Note: supporting documentation for any boxes checked must be maintained in the patient's medical records Patient is confused, Moderate/severe pain on movement, Medical attendant required, Requires oxygen - unable to self administer and Unable to tolerate seated position for time needed to transport      SIGNATURE OF PHYSICIAN OR OTHER AUTHORIZED HEALTHCARE PROFESSIONAL    I certify that the above information is true and correct based on my evaluation of this patient, and represent that the patient requires transport by ambulance and that other forms of transport are contraindicated.  I understand that this information will be used by the Centers for Medicare and Medicaid Services (CMS) to support the determiniation of medical necessity for ambulance services, and I represent that I have personal knowledge of the patient's condition at the time of transport.       If this box is checked, I also certify that the patient is physically or mentally incapable of signing the ambulance service's claim form and that the institution with which I am affiliated has furnished care, services or assistance to the patient.  My signature below is made on behalf of the patient pursuant to 42 .36(b)(4). In accordance with 42 .37, the specific reason(s) that the patient is physically or mentally incapable of signing the claim for is as follows:     Signature of Physician or " Healthcare Professional    DONALD Miller  Date/Time:     2/20/2022  10:51AM          (For Scheduled repetitive transport, this form is not valid for transports performed more than 60 days after this date).                                                                                                                                            --------------------------------------------------------------------------------------------  Printed Name and Credentials of Physician or Authorized Healthcare Professional     Form must be signed by patient's attending physician for scheduled, repetitive transports,.  For non-repetitive ambulance transports, if unable to obtain the signature of the attending physician, any of the following may sign (please select below):     Physician  Clinical Nurse Specialist  Registered Nurse     Physician Assistant x Discharge Planner  Licensed Practical Nurse     Nurse Practitioner x

## 2022-02-18 NOTE — PLAN OF CARE
Problem: Adult Inpatient Plan of Care  Goal: Plan of Care Review  Outcome: Ongoing, Progressing  Goal: Patient-Specific Goal (Individualized)  Outcome: Ongoing, Progressing  Goal: Absence of Hospital-Acquired Illness or Injury  Outcome: Ongoing, Progressing  Intervention: Identify and Manage Fall Risk  Recent Flowsheet Documentation  Taken 2/17/2022 2200 by Sofia Uribe RN  Safety Promotion/Fall Prevention: safety round/check completed  Taken 2/17/2022 2100 by Sofia Uribe RN  Safety Promotion/Fall Prevention: safety round/check completed  Taken 2/17/2022 2000 by Sofia Uribe RN  Safety Promotion/Fall Prevention: safety round/check completed  Taken 2/17/2022 1900 by Sofia Uribe RN  Safety Promotion/Fall Prevention:   safety round/check completed   fall prevention program maintained  Intervention: Prevent Skin Injury  Recent Flowsheet Documentation  Taken 2/17/2022 2200 by Sofia Uribe RN  Body Position:   weight shift assistance provided   turned   side-lying, left  Skin Protection:   skin-to-skin areas padded   skin-to-device areas padded  Taken 2/17/2022 2100 by Sofia Uribe RN  Body Position:   side-lying, right   turned  Skin Protection:   skin-to-skin areas padded   skin-to-device areas padded  Taken 2/17/2022 2000 by Sofia Uribe RN  Body Position:   sitting up in bed   tilted, left  Skin Protection:   skin-to-device areas padded   skin-to-skin areas padded  Taken 2/17/2022 1900 by Sofia Uribe RN  Body Position: sitting up in bed  Skin Protection:   skin-to-device areas padded   skin-to-skin areas padded  Intervention: Prevent Infection  Recent Flowsheet Documentation  Taken 2/17/2022 2200 by Sofia Uribe RN  Infection Prevention:   single patient room provided   rest/sleep promoted  Taken 2/17/2022 2100 by Sofia Uribe RN  Infection Prevention:   single patient room provided   rest/sleep promoted   hand hygiene promoted  Taken 2/17/2022 2000 by Sofia Uribe RN  Infection  Prevention:   single patient room provided   rest/sleep promoted  Goal: Optimal Comfort and Wellbeing  Outcome: Ongoing, Progressing  Goal: Readiness for Transition of Care  Outcome: Ongoing, Progressing     Problem: Fall Injury Risk  Goal: Absence of Fall and Fall-Related Injury  Outcome: Ongoing, Progressing  Intervention: Promote Injury-Free Environment  Recent Flowsheet Documentation  Taken 2/17/2022 2200 by Sofia Uribe RN  Safety Promotion/Fall Prevention: safety round/check completed  Taken 2/17/2022 2100 by Sofia Uribe RN  Safety Promotion/Fall Prevention: safety round/check completed  Taken 2/17/2022 2000 by Sofia Uribe RN  Safety Promotion/Fall Prevention: safety round/check completed  Taken 2/17/2022 1900 by Sofia Uribe RN  Safety Promotion/Fall Prevention:   safety round/check completed   fall prevention program maintained     Problem: Skin Injury Risk Increased  Goal: Skin Health and Integrity  Outcome: Ongoing, Progressing  Intervention: Optimize Skin Protection  Recent Flowsheet Documentation  Taken 2/17/2022 2200 by Sofia Uribe RN  Pressure Reduction Techniques:   frequent weight shift encouraged   weight shift assistance provided  Head of Bed (HOB): [facility] elevated  Pressure Reduction Devices: positioning supports utilized  Skin Protection:   skin-to-skin areas padded   skin-to-device areas padded  Taken 2/17/2022 2100 by Sofia Uribe RN  Pressure Reduction Techniques:   weight shift assistance provided   frequent weight shift encouraged  Head of Bed (HOB): [facility] elevated  Pressure Reduction Devices: positioning supports utilized  Skin Protection:   skin-to-skin areas padded   skin-to-device areas padded  Taken 2/17/2022 2000 by Sofia Uribe RN  Pressure Reduction Techniques:   frequent weight shift encouraged   weight shift assistance provided  Head of Bed (HOB): [facility] elevated  Pressure Reduction Devices: pressure-redistributing mattress utilized  Skin Protection:    skin-to-device areas padded   skin-to-skin areas padded  Taken 2/17/2022 1900 by Sofia Uribe, RN  Pressure Reduction Techniques:   frequent weight shift encouraged   weight shift assistance provided  Head of Bed (HOB): HOB elevated  Pressure Reduction Devices: positioning supports utilized  Skin Protection:   skin-to-device areas padded   skin-to-skin areas padded   Goal Outcome Evaluation:

## 2022-02-18 NOTE — CONSULTS
Adult Nutrition  Assessment    Patient Name:  Jaime Velez  YOB: 1926  MRN: [id number]  Admit Date:  2/14/2022    Assessment Date:  2/18/2022    Comments:  RD f/u. Pt on full liquid diet and is more alert. Spoke w/pt's daughter who indicates pt is not eating or drinking much at all. Plan is for d/c w/hospice.                  Nutrition Prescription Ordered     Row Name 02/18/22 1336          Nutrition Prescription PO    Current PO Diet Full Liquid     Fluid Consistency Thin                       Electronically signed by:  Itzel Garrett RD  02/18/22 13:37 CST

## 2022-02-18 NOTE — DISCHARGE PLACEMENT REQUEST
"Jaime Velez (89 y.o. Male)             Date of Birth Social Security Number Address Home Phone MRN    03/22/1926  [id number]  [facility] [phone] [id number]    Confucianist Marital Status             Judaism        Admission Date Admission Type Admitting Provider Attending Provider Department, Room/Bed    2/14/22 Emergency Behroozi, Saeid, MD Williams, Kevin L, MD [address], [address]    Discharge Date Discharge Disposition Discharge Destination                         Attending Provider: Glen Caro MD    Allergies: No Known Allergies    Isolation: None   Infection: COVID (History) (02/15/22)   Code Status: No CPR   Advance Care Planning Activity    Ht: 177.8 cm (70\")   Wt: 57.2 kg (126 lb 1.6 oz)    Admission Cmt: None   Principal Problem: None                Active Insurance as of 2/14/2022     Primary Coverage     Payor Plan Insurance Group Employer/Plan Group    [facility] [id number]     Payor Plan Address Payor Plan Phone Number Payor Plan Fax Number Effective Dates    [facility] OFFICE OF COMMUNITY CARE [phone]  10/30/2004 - None Entered    [address] [address]       [facility] [address]       Subscriber Name Subscriber Birth Date Member ID       JAIME VELEZ 3/22/1926 [id number]                 Emergency Contacts      (Rel.) Home Phone Work Phone Mobile Phone    Bhavani Breaux (Daughter) [phone] -- [phone]              "

## 2022-02-18 NOTE — NURSING NOTE
Daughter and Hospice nurse in room.Requesting ativan due to pt appearing restless. Position changed multiple times for comfort.

## 2022-02-18 NOTE — PLAN OF CARE
Goal Outcome Evaluation:  Plan of Care Reviewed With: patient, daughter      Increased anxiety in am. Drowsy at times. [place]. Total care with frequent turns and pressure relief.Incontinent of B&B.

## 2022-02-18 NOTE — PROGRESS NOTES
[facility] Medicine Services  INPATIENT PROGRESS NOTE    Length of Stay: 4  Date of Admission: 2/14/2022  Primary Care Physician: Adolfo Ackerman MD    Subjective   Chief Complaint: Hypoxia  HPI: Appears comfortable.  Confused.    Review of Systems   Unable to perform ROS: Mental status change        All pertinent negatives and positives are as above. All other systems have been reviewed and are negative unless otherwise stated.     Objective    Temp:  [97.4 °F (36.3 °C)-98.2 °F (36.8 °C)] 97.6 °F (36.4 °C)  Heart Rate:  [76-92] 77  Resp:  [18-20] 18  BP: (131-145)/(59-86) 131/86    Physical Exam  Vitals reviewed.   Constitutional:       Appearance: He is well-developed.   HENT:      Head: Normocephalic and atraumatic.   Eyes:      Pupils: Pupils are equal, round, and reactive to light.   Cardiovascular:      Rate and Rhythm: Normal rate and regular rhythm.      Heart sounds: Normal heart sounds. No murmur heard.  No friction rub. No gallop.    Pulmonary:      Effort: Pulmonary effort is normal. No respiratory distress.      Breath sounds: Normal breath sounds. No wheezing or rales.   Chest:      Chest wall: No tenderness.   Abdominal:      General: Bowel sounds are normal. There is no distension.      Palpations: Abdomen is soft.      Tenderness: There is no abdominal tenderness.   Musculoskeletal:      Cervical back: Normal range of motion and neck supple.   Psychiatric:         Behavior: Behavior normal.       Results Review:  I have reviewed the labs, radiology results, and diagnostic studies.    Laboratory Data:   Results from last 7 days   Lab Units 02/18/22  0509 02/17/22  0524 02/16/22  1127 02/15/22  0616 02/15/22  0616 02/14/22  1804 02/14/22  1804   SODIUM mmol/L 144 149* 150*   < > 152*   < > 150*   POTASSIUM mmol/L 3.8 4.2 4.1   < > 4.6   < > 5.0   CHLORIDE mmol/L 115* 118* 119*   < > 118*   < > 110*   CO2 mmol/L 15.0* 18.0* 20.0*   < > 20.0*   < >  19.0*   BUN mg/dL 43* 58* 67*   < > 87*   < > 93*   CREATININE mg/dL 1.90* 2.18* 2.42*   < > 3.45*   < > 3.33*   GLUCOSE mg/dL 79 71 77   < > 113*   < > 127*   CALCIUM mg/dL 8.1* 8.2 8.8   < > 9.3   < > 11.1*   BILIRUBIN mg/dL  --   --   --   --  0.4  --  0.5   ALK PHOS U/L  --   --   --   --  82  --  100   ALT (SGPT) U/L  --   --   --   --  17  --  20   AST (SGOT) U/L  --   --   --   --  28  --  31   ANION GAP mmol/L 14.0 13.0 11.0   < > 14.0   < > 21.0*    < > = values in this interval not displayed.     Estimated Creatinine Clearance: 18.8 mL/min (A) (by C-G formula based on SCr of 1.9 mg/dL (H)).  Results from last 7 days   Lab Units 02/15/22  0616 02/14/22  1804   MAGNESIUM mg/dL 2.6* 3.0*         Results from last 7 days   Lab Units 02/15/22  0616 02/14/22  1804   WBC 10*3/mm3 8.51 10.48   HEMOGLOBIN g/dL 14.5 16.9   HEMATOCRIT % 45.3 50.6   PLATELETS 10*3/mm3 192 317           Culture Data:   No results found for: BLOODCX  No results found for: URINECX  No results found for: RESPCX  No results found for: WOUNDCX  No results found for: STOOLCX  No components found for: BODYFLD    Radiology Data:   Imaging Results (Last 24 Hours)     ** No results found for the last 24 hours. **          I have reviewed the patient's current medications.     Assessment/Plan     Active Hospital Problems    Diagnosis    • Dehydration    • Acute renal failure (ARF) ([facility])        Plan:    1.  Acute kidney injury: Continue IV fluids for now.  Renal function continues to improve.  2.  Atrial fibrillation with rapid ventricular response: Oral Lopressor.  3.  Chronic hypoxic respiratory failure: Status post COVID-19.  4.  Cholelithiasis  5.  Renal mass: Per family this will be monitored.  No work-up.  6.  DVT prophylaxis: Eliquis.    Hospice will meet with patient's family today at around 11.      The patient was evaluated during the global COVID-19 pandemic, and the diagnosis was suspected/considered upon their initial presentation.   Evaluation, treatment, and testing were consistent with current guidelines for patients who present with complaints or symptoms that may be related to COVID-19.    I confirmed that the patient's Advance Care Plan is present, code status is documented, or surrogate decision maker is listed in the patient's medical record.       Discharge Planning: I expect patient to be discharged to skilled facility in 1-2 days.        This document has been electronically signed by Glen Caro MD on February 18, 2022 15:05 CST

## 2022-02-19 LAB
ANION GAP SERPL CALCULATED.3IONS-SCNC: 13 MMOL/L (ref 5–15)
BACTERIA SPEC AEROBE CULT: NORMAL
BACTERIA SPEC AEROBE CULT: NORMAL
BUN SERPL-MCNC: 33 MG/DL (ref 8–23)
BUN/CREAT SERPL: 19.4 (ref 7–25)
CALCIUM SPEC-SCNC: 8 MG/DL (ref 8.2–9.6)
CHLORIDE SERPL-SCNC: 116 MMOL/L (ref 98–107)
CO2 SERPL-SCNC: 16 MMOL/L (ref 22–29)
CREAT SERPL-MCNC: 1.7 MG/DL (ref 0.76–1.27)
GFR SERPL CREATININE-BSD FRML MDRD: 38 ML/MIN/1.73
GLUCOSE SERPL-MCNC: 76 MG/DL (ref 65–99)
POTASSIUM SERPL-SCNC: 3.7 MMOL/L (ref 3.5–5.2)
SODIUM SERPL-SCNC: 145 MMOL/L (ref 136–145)

## 2022-02-19 PROCEDURE — 63710000001 PREDNISONE PER 5 MG: Performed by: INTERNAL MEDICINE

## 2022-02-19 PROCEDURE — 25010000002 PIPERACILLIN SOD-TAZOBACTAM PER 1 G: Performed by: INTERNAL MEDICINE

## 2022-02-19 PROCEDURE — 80048 BASIC METABOLIC PNL TOTAL CA: CPT | Performed by: HOSPITALIST

## 2022-02-19 RX ORDER — FAMOTIDINE 20 MG/1
20 TABLET, FILM COATED ORAL DAILY
Status: DISCONTINUED | OUTPATIENT
Start: 2022-02-20 | End: 2022-02-20 | Stop reason: HOSPADM

## 2022-02-19 RX ADMIN — PIPERACILLIN SODIUM AND TAZOBACTAM SODIUM 3.38 G: 3; .375 INJECTION, POWDER, LYOPHILIZED, FOR SOLUTION INTRAVENOUS at 21:36

## 2022-02-19 RX ADMIN — PIPERACILLIN SODIUM AND TAZOBACTAM SODIUM 3.38 G: 3; .375 INJECTION, POWDER, LYOPHILIZED, FOR SOLUTION INTRAVENOUS at 10:00

## 2022-02-19 RX ADMIN — FAMOTIDINE 20 MG: 10 INJECTION INTRAVENOUS at 08:17

## 2022-02-19 RX ADMIN — METOPROLOL TARTRATE 12.5 MG: 25 TABLET, FILM COATED ORAL at 21:34

## 2022-02-19 RX ADMIN — PREDNISONE 3 MG: 1 TABLET ORAL at 08:16

## 2022-02-19 RX ADMIN — APIXABAN 2.5 MG: 2.5 TABLET, FILM COATED ORAL at 21:34

## 2022-02-19 RX ADMIN — METOPROLOL TARTRATE 12.5 MG: 25 TABLET, FILM COATED ORAL at 08:16

## 2022-02-19 RX ADMIN — SODIUM CHLORIDE 100 ML/HR: 4.5 INJECTION, SOLUTION INTRAVENOUS at 10:00

## 2022-02-19 RX ADMIN — Medication 3 MG: at 21:34

## 2022-02-19 RX ADMIN — SERTRALINE 50 MG: 50 TABLET, FILM COATED ORAL at 08:16

## 2022-02-19 RX ADMIN — SODIUM CHLORIDE, PRESERVATIVE FREE 10 ML: 5 INJECTION INTRAVENOUS at 21:35

## 2022-02-19 RX ADMIN — APIXABAN 2.5 MG: 2.5 TABLET, FILM COATED ORAL at 08:16

## 2022-02-19 NOTE — PROGRESS NOTES
Jaime Velez is a 89 y.o. male who qualifies for IV to PO therapy conversion.    famotidine has been changed from IV to PO per System Policy.       Karen Ruffin, PharmD  02/19/22  15:06 CST

## 2022-02-19 NOTE — PROGRESS NOTES
[facility] Medicine Services  INPATIENT PROGRESS NOTE    Length of Stay: 5  Date of Admission: 2/14/2022  Primary Care Physician: Adolfo Ackerman MD    Subjective   Chief Complaint: Hypoxia  HPI: Resting comfortably.    Review of Systems   Unable to perform ROS: Mental status change        All pertinent negatives and positives are as above. All other systems have been reviewed and are negative unless otherwise stated.     Objective    Temp:  [97.1 °F (36.2 °C)-98.5 °F (36.9 °C)] 98.5 °F (36.9 °C)  Heart Rate:  [77-93] 89  Resp:  [16-20] 18  BP: (114-141)/(53-78) 114/64    Physical Exam  Vitals reviewed.   Constitutional:       Appearance: He is well-developed.   HENT:      Head: Normocephalic and atraumatic.   Eyes:      Pupils: Pupils are equal, round, and reactive to light.   Cardiovascular:      Rate and Rhythm: Normal rate and regular rhythm.      Heart sounds: Normal heart sounds. No murmur heard.  No friction rub. No gallop.    Pulmonary:      Effort: Pulmonary effort is normal. No respiratory distress.      Breath sounds: Normal breath sounds. No wheezing or rales.   Chest:      Chest wall: No tenderness.   Abdominal:      General: Bowel sounds are normal. There is no distension.      Palpations: Abdomen is soft.      Tenderness: There is no abdominal tenderness.   Musculoskeletal:      Cervical back: Normal range of motion and neck supple.   Psychiatric:         Behavior: Behavior normal.       Results Review:  I have reviewed the labs, radiology results, and diagnostic studies.    Laboratory Data:   Results from last 7 days   Lab Units 02/19/22  0650 02/18/22  0509 02/17/22  0524 02/16/22  1127 02/15/22  0616 02/14/22  1804 02/14/22  1804   SODIUM mmol/L 145 144 149*   < > 152*   < > 150*   POTASSIUM mmol/L 3.7 3.8 4.2   < > 4.6   < > 5.0   CHLORIDE mmol/L 116* 115* 118*   < > 118*   < > 110*   CO2 mmol/L 16.0* 15.0* 18.0*   < > 20.0*   < > 19.0*   BUN  mg/dL 33* 43* 58*   < > 87*   < > 93*   CREATININE mg/dL 1.70* 1.90* 2.18*   < > 3.45*   < > 3.33*   GLUCOSE mg/dL 76 79 71   < > 113*   < > 127*   CALCIUM mg/dL 8.0* 8.1* 8.2   < > 9.3   < > 11.1*   BILIRUBIN mg/dL  --   --   --   --  0.4  --  0.5   ALK PHOS U/L  --   --   --   --  82  --  100   ALT (SGPT) U/L  --   --   --   --  17  --  20   AST (SGOT) U/L  --   --   --   --  28  --  31   ANION GAP mmol/L 13.0 14.0 13.0   < > 14.0   < > 21.0*    < > = values in this interval not displayed.     Estimated Creatinine Clearance: 20.2 mL/min (A) (by C-G formula based on SCr of 1.7 mg/dL (H)).  Results from last 7 days   Lab Units 02/15/22  0616 02/14/22  1804   MAGNESIUM mg/dL 2.6* 3.0*         Results from last 7 days   Lab Units 02/15/22  0616 02/14/22  1804   WBC 10*3/mm3 8.51 10.48   HEMOGLOBIN g/dL 14.5 16.9   HEMATOCRIT % 45.3 50.6   PLATELETS 10*3/mm3 192 317           Culture Data:   No results found for: BLOODCX  No results found for: URINECX  No results found for: RESPCX  No results found for: WOUNDCX  No results found for: STOOLCX  No components found for: BODYFLD    Radiology Data:   Imaging Results (Last 24 Hours)     ** No results found for the last 24 hours. **          I have reviewed the patient's current medications.     Assessment/Plan     Active Hospital Problems    Diagnosis    • Dehydration    • Acute renal failure (ARF) (Formerly [facility])        Plan:    1.  Acute kidney injury:  Renal function continues to improve.  Creatinine currently 1.7.  2.  Atrial fibrillation with rapid ventricular response: Oral Lopressor.  3.  Chronic hypoxic respiratory failure: Status post COVID-19.  4.  Cholelithiasis  5.  Renal mass: Per family this will be monitored.  No work-up.  6.  DVT prophylaxis: Eliquis.    Plan for patient to go to daughter's house with hospice on Sunday.      The patient was evaluated during the global COVID-19 pandemic, and the diagnosis was suspected/considered upon their initial presentation.   Evaluation, treatment, and testing were consistent with current guidelines for patients who present with complaints or symptoms that may be related to COVID-19.    I confirmed that the patient's Advance Care Plan is present, code status is documented, or surrogate decision maker is listed in the patient's medical record.       Discharge Planning: I expect patient to be discharged to skilled facility in 1-2 days.        This document has been electronically signed by Glen Caro MD on February 19, 2022 14:49 CST

## 2022-02-19 NOTE — PLAN OF CARE
Problem: Adult Inpatient Plan of Care  Goal: Plan of Care Review  2/19/2022 0103 by Wilian Martin RN  Outcome: Ongoing, Progressing  Flowsheets (Taken 2/19/2022 0103)  Progress: no change     Problem: Adult Inpatient Plan of Care  Goal: Patient-Specific Goal (Individualized)  2/19/2022 0103 by Wilian Martin RN  Outcome: Ongoing, Progressing     Problem: Adult Inpatient Plan of Care  Goal: Absence of Hospital-Acquired Illness or Injury  2/19/2022 0103 by Wilian Martin RN  Outcome: Ongoing, Progressing     Problem: Adult Inpatient Plan of Care  Goal: Absence of Hospital-Acquired Illness or Injury  Intervention: Identify and Manage Fall Risk  Flowsheets  Taken 2/19/2022 0000  Safety Promotion/Fall Prevention:   assistive device/personal items within reach   clutter free environment maintained   nonskid shoes/slippers when out of bed   safety round/check completed  Taken 2/18/2022 2200  Safety Promotion/Fall Prevention:   safety round/check completed   nonskid shoes/slippers when out of bed   clutter free environment maintained   assistive device/personal items within reach  Taken 2/18/2022 2100  Safety Promotion/Fall Prevention:   assistive device/personal items within reach   clutter free environment maintained   nonskid shoes/slippers when out of bed   safety round/check completed  Taken 2/18/2022 2000  Safety Promotion/Fall Prevention:   safety round/check completed   nonskid shoes/slippers when out of bed   clutter free environment maintained   assistive device/personal items within reach  Taken 2/18/2022 1900  Safety Promotion/Fall Prevention:   assistive device/personal items within reach   clutter free environment maintained   nonskid shoes/slippers when out of bed   safety round/check completed     Problem: Adult Inpatient Plan of Care  Goal: Absence of Hospital-Acquired Illness or Injury  Intervention: Prevent Skin Injury  Flowsheets  Taken 2/19/2022 0000 by Meli Garcia PCT  Body Position:    turned   side-lying, right  Taken 2/19/2022 0000 by Wilian Martin RN  Skin Protection: incontinence pads utilized  Taken 2/18/2022 2200 by Wilian Martin RN  Body Position:   turned   side-lying, left  Skin Protection: incontinence pads utilized  Taken 2/18/2022 2100 by Wilian Martin RN  Body Position: side-lying, right  Skin Protection: incontinence pads utilized  Taken 2/18/2022 2000 by Wilian Martin RN  Body Position:   side-lying, right   turned  Skin Protection: incontinence pads utilized  Taken 2/18/2022 1900 by Wilian Martin RN  Body Position: sitting up in bed  Skin Protection: incontinence pads utilized     Problem: Adult Inpatient Plan of Care  Goal: Absence of Hospital-Acquired Illness or Injury  Intervention: Prevent and Manage VTE (venous thromboembolism) Risk  Flowsheets  Taken 2/19/2022 0000  VTE Prevention/Management: (pt taking eliquis) --  Taken 2/18/2022 2200  VTE Prevention/Management: (pt taking eliquis) --  Taken 2/18/2022 2100  VTE Prevention/Management: (pt taking eliquis) --  Taken 2/18/2022 2000  VTE Prevention/Management: (pt taking eliquis) --  Taken 2/18/2022 1900  VTE Prevention/Management: (pt taking eliquis) --     Problem: Adult Inpatient Plan of Care  Goal: Absence of Hospital-Acquired Illness or Injury  Intervention: Prevent Infection  Flowsheets  Taken 2/19/2022 0000  Infection Prevention: rest/sleep promoted  Taken 2/18/2022 2200  Infection Prevention: rest/sleep promoted  Taken 2/18/2022 2100  Infection Prevention: rest/sleep promoted  Taken 2/18/2022 2000  Infection Prevention: rest/sleep promoted  Taken 2/18/2022 1900  Infection Prevention: rest/sleep promoted     Problem: Adult Inpatient Plan of Care  Goal: Optimal Comfort and Wellbeing  2/19/2022 0103 by Wilian Martin RN  Outcome: Ongoing, Progressing     Problem: Adult Inpatient Plan of Care  Goal: Optimal Comfort and Wellbeing  Intervention: Provide Person-Centered Care  Flowsheets (Taken 2/18/2022  2000)  Trust Relationship/Rapport:   care explained   questions answered     Problem: Adult Inpatient Plan of Care  Goal: Readiness for Transition of Care  2/19/2022 0103 by Wilian Martin RN  Outcome: Ongoing, Progressing     Problem: Adult Inpatient Plan of Care  Goal: Readiness for Transition of Care  Intervention: Mutually Develop Transition Plan  Flowsheets  Taken 2/15/2022 1621 by Jenifer Lira RN  Equipment Currently Used at Home:   walker, standard   hospital bed  Taken 2/15/2022 1613 by Jenifer Lira RN  Transportation Anticipated: (ems) other (see comments)  Transportation Concerns: car, none  Patient/Family Anticipated Services at Transition: skilled nursing  Patient/Family Anticipates Transition to: long-term care facility     Problem: Fall Injury Risk  Goal: Absence of Fall and Fall-Related Injury  2/19/2022 0103 by Wilian Martin RN  Outcome: Ongoing, Progressing     Problem: Fall Injury Risk  Goal: Absence of Fall and Fall-Related Injury  Intervention: Identify and Manage Contributors to Fall Injury Risk  Flowsheets  Taken 2/18/2022 2000 by Wilian Martin RN  Medication Review/Management: medications reviewed  Taken 2/16/2022 0915 by Jenifer Lira RN  Self-Care Promotion: (total assist) other (see comments)     Problem: Fall Injury Risk  Goal: Absence of Fall and Fall-Related Injury  Intervention: Promote Injury-Free Environment  Flowsheets  Taken 2/19/2022 0000  Safety Promotion/Fall Prevention:   assistive device/personal items within reach   clutter free environment maintained   nonskid shoes/slippers when out of bed   safety round/check completed  Taken 2/18/2022 2200  Safety Promotion/Fall Prevention:   safety round/check completed   nonskid shoes/slippers when out of bed   clutter free environment maintained   assistive device/personal items within reach  Taken 2/18/2022 2100  Safety Promotion/Fall Prevention:   assistive device/personal items within reach   clutter free environment  maintained   nonskid shoes/slippers when out of bed   safety round/check completed  Taken 2/18/2022 2000  Safety Promotion/Fall Prevention:   safety round/check completed   nonskid shoes/slippers when out of bed   clutter free environment maintained   assistive device/personal items within reach  Taken 2/18/2022 1900  Safety Promotion/Fall Prevention:   assistive device/personal items within reach   clutter free environment maintained   nonskid shoes/slippers when out of bed   safety round/check completed     Problem: Skin Injury Risk Increased  Goal: Skin Health and Integrity  2/19/2022 0103 by Wilian Martin RN  Outcome: Ongoing, Progressing     Problem: Skin Injury Risk Increased  Goal: Skin Health and Integrity  Intervention: Optimize Skin Protection  Flowsheets  Taken 2/19/2022 0000 by Wilian Martin RN  Pressure Reduction Techniques: weight shift assistance provided  Pressure Reduction Devices:   positioning supports utilized   specialty bed utilized  Skin Protection: incontinence pads utilized  Taken 2/19/2022 0000 by Meli Garcia PCT  Head of Bed (HOB): HOB elevated  Taken 2/18/2022 2200 by Wilian Martin RN  Pressure Reduction Techniques: weight shift assistance provided  Head of Bed (HOB): HOB at 20-30 degrees  Pressure Reduction Devices: positioning supports utilized  Skin Protection: incontinence pads utilized  Taken 2/18/2022 2100 by Wilian Martin RN  Pressure Reduction Techniques: frequent weight shift encouraged  Head of Bed (HOB): HOB at 20-30 degrees  Pressure Reduction Devices: positioning supports utilized  Skin Protection: incontinence pads utilized  Taken 2/18/2022 2000 by Wilian Martin RN  Pressure Reduction Techniques: weight shift assistance provided  Head of Bed (HOB): HOB at 20-30 degrees  Pressure Reduction Devices: positioning supports utilized  Skin Protection: incontinence pads utilized  Taken 2/18/2022 1900 by Wilian Martin RN  Pressure Reduction Techniques: frequent  weight shift encouraged  Head of Bed (HOB): HOB at 20-30 degrees  Pressure Reduction Devices: positioning supports utilized  Skin Protection: incontinence pads utilized   Goal Outcome Evaluation:  Plan of Care Reviewed With: patient

## 2022-02-20 VITALS
RESPIRATION RATE: 16 BRPM | WEIGHT: 121.6 LBS | OXYGEN SATURATION: 94 % | HEIGHT: 70 IN | TEMPERATURE: 97.7 F | HEART RATE: 79 BPM | DIASTOLIC BLOOD PRESSURE: 56 MMHG | SYSTOLIC BLOOD PRESSURE: 117 MMHG | BODY MASS INDEX: 17.41 KG/M2

## 2022-02-20 LAB
ANION GAP SERPL CALCULATED.3IONS-SCNC: 16 MMOL/L (ref 5–15)
BUN SERPL-MCNC: 27 MG/DL (ref 8–23)
BUN/CREAT SERPL: 16.6 (ref 7–25)
CALCIUM SPEC-SCNC: 8.3 MG/DL (ref 8.2–9.6)
CHLORIDE SERPL-SCNC: 114 MMOL/L (ref 98–107)
CO2 SERPL-SCNC: 14 MMOL/L (ref 22–29)
CREAT SERPL-MCNC: 1.63 MG/DL (ref 0.76–1.27)
GFR SERPL CREATININE-BSD FRML MDRD: 40 ML/MIN/1.73
GLUCOSE SERPL-MCNC: 56 MG/DL (ref 65–99)
POTASSIUM SERPL-SCNC: 3.8 MMOL/L (ref 3.5–5.2)
SODIUM SERPL-SCNC: 144 MMOL/L (ref 136–145)

## 2022-02-20 PROCEDURE — 80048 BASIC METABOLIC PNL TOTAL CA: CPT | Performed by: HOSPITALIST

## 2022-02-20 PROCEDURE — 25010000002 PIPERACILLIN SOD-TAZOBACTAM PER 1 G: Performed by: INTERNAL MEDICINE

## 2022-02-20 PROCEDURE — 63710000001 PREDNISONE PER 5 MG: Performed by: INTERNAL MEDICINE

## 2022-02-20 RX ADMIN — SERTRALINE 50 MG: 50 TABLET, FILM COATED ORAL at 09:33

## 2022-02-20 RX ADMIN — METOPROLOL TARTRATE 12.5 MG: 25 TABLET, FILM COATED ORAL at 09:33

## 2022-02-20 RX ADMIN — SODIUM CHLORIDE 100 ML/HR: 4.5 INJECTION, SOLUTION INTRAVENOUS at 06:20

## 2022-02-20 RX ADMIN — APIXABAN 2.5 MG: 2.5 TABLET, FILM COATED ORAL at 09:33

## 2022-02-20 RX ADMIN — PREDNISONE 3 MG: 1 TABLET ORAL at 09:33

## 2022-02-20 RX ADMIN — FAMOTIDINE 20 MG: 20 TABLET, FILM COATED ORAL at 09:33

## 2022-02-20 RX ADMIN — PIPERACILLIN SODIUM AND TAZOBACTAM SODIUM 3.38 G: 3; .375 INJECTION, POWDER, LYOPHILIZED, FOR SOLUTION INTRAVENOUS at 09:33

## 2022-02-20 NOTE — DISCHARGE SUMMARY
[facility] Medicine Services  DISCHARGE SUMMARY       Date of Admission: 2/14/2022  Date of Discharge:  2/20/2022  Primary Care Physician: Adolfo Ackerman MD    Presenting Problem/History of Present Illness:  Dehydration [E86.0]  Kidney mass [N28.89]  Hypoxia [R09.02]  Acute renal failure (ARF) (HCC) [N17.9]  ARCHANA (acute kidney injury) (HCC) [N17.9]  Calculus of gallbladder without cholecystitis without obstruction [K80.20]       Final Discharge Diagnoses:  Active Hospital Problems    Diagnosis    • Dehydration    • Acute renal failure (ARF) (HCC)        Consults:   Consults     Date and Time Order Name Status Description    2/14/2022 11:25 PM Hospitalist (on-call MD unless specified)          Pertinent Test Results:   Lab Results (most recent)     Procedure Component Value Units Date/Time    Basic Metabolic Panel [[id number]]  (Abnormal) Collected: 02/20/22 0613    Specimen: Blood Updated: 02/20/22 0739     Glucose 56 mg/dL      BUN 27 mg/dL      Creatinine 1.63 mg/dL      Sodium 144 mmol/L      Potassium 3.8 mmol/L      Chloride 114 mmol/L      CO2 14.0 mmol/L      Calcium 8.3 mg/dL      eGFR Non African Amer 40 mL/min/1.73      BUN/Creatinine Ratio 16.6     Anion Gap 16.0 mmol/L     Narrative:      GFR Normal >60  Chronic Kidney Disease <60  Kidney Failure <15      Blood Culture - Blood, Arm, Left [[id number]]  (Normal) Collected: 02/14/22 1943    Specimen: Blood from Arm, Left Updated: 02/19/22 2000     Blood Culture No growth at 5 days    Blood Culture - Blood, Arm, Right [[id number]]  (Normal) Collected: 02/14/22 1814    Specimen: Blood from Arm, Right Updated: 02/19/22 1932     Blood Culture No growth at 5 days    Basic Metabolic Panel [[id number]]  (Abnormal) Collected: 02/19/22 0650    Specimen: Blood Updated: 02/19/22 0740     Glucose 76 mg/dL      BUN 33 mg/dL      Creatinine 1.70 mg/dL      Sodium 145 mmol/L      Potassium 3.7 mmol/L      Chloride 116 mmol/L       CO2 16.0 mmol/L      Calcium 8.0 mg/dL      eGFR Non African Amer 38 mL/min/1.73      BUN/Creatinine Ratio 19.4     Anion Gap 13.0 mmol/L     Narrative:      GFR Normal >60  Chronic Kidney Disease <60  Kidney Failure <15      Extra Tubes [[id number]] Collected: 02/17/22 0525    Specimen: Blood, Venous Line Updated: 02/17/22 0731    Narrative:      The following orders were created for panel order Extra Tubes.  Procedure                               Abnormality         Status                     ---------                               -----------         ------                     Lavender Top[[id number]]                                     Final result                 Please view results for these tests on the individual orders.    Lavender Top [[id number]] Collected: 02/17/22 0525    Specimen: Blood Updated: 02/17/22 0731     Extra Tube hold for add-on     Comment: Auto resulted       Extra Tubes [[id number]] Collected: 02/16/22 1127    Specimen: Blood, Venous Line Updated: 02/16/22 1230    Narrative:      The following orders were created for panel order Extra Tubes.  Procedure                               Abnormality         Status                     ---------                               -----------         ------                     Lavender Top[[id number]]                                     Final result                 Please view results for these tests on the individual orders.    Lavender Top [[id number]] Collected: 02/16/22 1127    Specimen: Blood Updated: 02/16/22 1230     Extra Tube hold for add-on     Comment: Auto resulted       Hemoglobin A1c [[id number]]  (Abnormal) Collected: 02/15/22 0616    Specimen: Blood Updated: 02/15/22 0841     Hemoglobin A1C 6.40 %     Narrative:      Hemoglobin A1C Ranges:    Increased Risk for Diabetes  5.7% to 6.4%  Diabetes                     >= 6.5%  Diabetic Goal                < 7.0%    Troponin [[id number]]  (Abnormal) Collected: 02/15/22 0616    Specimen: Blood  Updated: 02/15/22 0713     Troponin T 0.071 ng/mL     Narrative:      Troponin T Reference Range:  <= 0.03 ng/mL-   Negative for AMI  >0.03 ng/mL-     Abnormal for myocardial necrosis.  Clinicians would have to utilize clinical acumen, EKG, Troponin and serial changes to determine if it is an Acute Myocardial Infarction or myocardial injury due to an underlying chronic condition.       Results may be falsely decreased if patient taking Biotin.      T4, Free [[id number]]  (Normal) Collected: 02/15/22 0616    Specimen: Blood Updated: 02/15/22 0710     Free T4 1.02 ng/dL     Narrative:      Results may be falsely increased if patient taking Biotin.      TSH [[id number]]  (Normal) Collected: 02/15/22 0616    Specimen: Blood Updated: 02/15/22 0710     TSH 3.760 uIU/mL     Comprehensive Metabolic Panel [[id number]]  (Abnormal) Collected: 02/15/22 0616    Specimen: Blood Updated: 02/15/22 0656     Glucose 113 mg/dL      BUN 87 mg/dL      Creatinine 3.45 mg/dL      Sodium 152 mmol/L      Potassium 4.6 mmol/L      Chloride 118 mmol/L      CO2 20.0 mmol/L      Calcium 9.3 mg/dL      Total Protein 6.5 g/dL      Albumin 3.70 g/dL      ALT (SGPT) 17 U/L      AST (SGOT) 28 U/L      Alkaline Phosphatase 82 U/L      Total Bilirubin 0.4 mg/dL      eGFR Non African Amer 17 mL/min/1.73      Globulin 2.8 gm/dL      A/G Ratio 1.3 g/dL      BUN/Creatinine Ratio 25.2     Anion Gap 14.0 mmol/L     Narrative:      GFR Normal >60  Chronic Kidney Disease <60  Kidney Failure <15      Amylase [[id number]]  (Abnormal) Collected: 02/15/22 0616    Specimen: Blood Updated: 02/15/22 0656     Amylase 134 U/L     Lipase [[id number]]  (Abnormal) Collected: 02/15/22 0616    Specimen: Blood Updated: 02/15/22 0656     Lipase 248 U/L     Magnesium [[id number]]  (Abnormal) Collected: 02/15/22 0616    Specimen: Blood Updated: 02/15/22 0656     Magnesium 2.6 mg/dL     CBC (No Diff) [[id number]]  (Normal) Collected: 02/15/22 0616    Specimen: Blood Updated: 02/15/22  0640     WBC 8.51 10*3/mm3      RBC 4.94 10*6/mm3      Hemoglobin 14.5 g/dL      Hematocrit 45.3 %      MCV 91.7 fL      MCH 29.4 pg      MCHC 32.0 g/dL      RDW 14.5 %      RDW-SD 48.0 fl      MPV 11.1 fL      Platelets 192 10*3/mm3     Urinalysis With Microscopic If Indicated (No Culture) - Urine, Catheter [[id number]]  (Abnormal) Collected: 02/15/22 0138    Specimen: Urine, Catheter Updated: 02/15/22 0155     Color, UA Yellow     Appearance, UA Clear     pH, UA <=5.0     Specific Gravity, UA 1.021     Glucose, UA Negative     Ketones, UA Negative     Bilirubin, UA Negative     Blood, UA Negative     Protein, UA 30 mg/dL (1+)     Leuk Esterase, UA Small (1+)     Nitrite, UA Negative     Urobilinogen, UA 0.2 E.U./dL    Urinalysis, Microscopic Only - Urine, Catheter [[id number]]  (Abnormal) Collected: 02/15/22 0138    Specimen: Urine, Catheter Updated: 02/15/22 0155     RBC, UA 0-2 /HPF      WBC, UA 21-30 /HPF      Bacteria, UA None Seen /HPF      Squamous Epithelial Cells, UA 0-2 /HPF      Hyaline Casts, UA 7-12 /LPF      Methodology Automated Microscopy    Gray Top [[id number]] Collected: 02/14/22 1804    Specimen: Blood Updated: 02/14/22 2215     Extra Tube Hold for add-ons.     Comment: Auto resulted.       STAT Lactic Acid, Reflex [[id number]]  (Abnormal) Collected: 02/14/22 2129    Specimen: Blood Updated: 02/14/22 2149     Lactate 4.5 mmol/L     Blood Gas, Arterial - [[id number]]  (Abnormal) Collected: 02/14/22 2107    Specimen: Arterial Blood Updated: 02/14/22 2114     Site Left Radial     Allan's Test N/A     pH, Arterial 7.549 pH units      Comment: 83 Value above reference range        pCO2, Arterial 14.6 mm Hg      Comment: 85 Value below critical limit        pO2, Arterial 147.0 mm Hg      Comment: 83 Value above reference range        HCO3, Arterial 12.7 mmol/L      Comment: 84 Value below reference range        Base Excess, Arterial -5.8 mmol/L      Comment: 84 Value below reference range        O2  Saturation, Arterial 99.7 %      Comment: 83 Value above reference range        Barometric Pressure for Blood Gas 756 mmHg      Modality Room Air     Ventilator Mode NA     Collected by [email or web address]     Comment: Meter: [id number]     :  [id number]       Troponin [[id number]]  (Abnormal) Collected: 02/14/22 1804    Specimen: Blood Updated: 02/14/22 1922     Troponin T 0.080 ng/mL     Narrative:      Troponin T Reference Range:  <= 0.03 ng/mL-   Negative for AMI  >0.03 ng/mL-     Abnormal for myocardial necrosis.  Clinicians would have to utilize clinical acumen, EKG, Troponin and serial changes to determine if it is an Acute Myocardial Infarction or myocardial injury due to an underlying chronic condition.       Results may be falsely decreased if patient taking Biotin.      Comprehensive Metabolic Panel [[id number]]  (Abnormal) Collected: 02/14/22 1804    Specimen: Blood Updated: 02/14/22 1915     Glucose 127 mg/dL      BUN 93 mg/dL      Creatinine 3.33 mg/dL      Sodium 150 mmol/L      Potassium 5.0 mmol/L      Chloride 110 mmol/L      CO2 19.0 mmol/L      Calcium 11.1 mg/dL      Total Protein 8.1 g/dL      Albumin 4.50 g/dL      ALT (SGPT) 20 U/L      AST (SGOT) 31 U/L      Alkaline Phosphatase 100 U/L      Total Bilirubin 0.5 mg/dL      eGFR Non African Amer 17 mL/min/1.73      Globulin 3.6 gm/dL      A/G Ratio 1.3 g/dL      BUN/Creatinine Ratio 27.9     Anion Gap 21.0 mmol/L     Narrative:      GFR Normal >60  Chronic Kidney Disease <60  Kidney Failure <15      Lipase [[id number]]  (Abnormal) Collected: 02/14/22 1804    Specimen: Blood Updated: 02/14/22 1915     Lipase 296 U/L     Magnesium [[id number]]  (Abnormal) Collected: 02/14/22 1804    Specimen: Blood Updated: 02/14/22 1915     Magnesium 3.0 mg/dL     Gold Top - SST [[id number]] Collected: 02/14/22 1804    Specimen: Blood Updated: 02/14/22 1915     Extra Tube Hold for add-ons.     Comment: Auto resulted.       Light Blue Top [[id number]] Collected:  02/14/22 1804    Specimen: Blood Updated: 02/14/22 1915     Extra Tube hold for add-on     Comment: Auto resulted       [place] Blood Culture Bottle Set [[id number]] Collected: 02/14/22 1804    Specimen: Blood Updated: 02/14/22 1915     Extra Tube Hold for add-ons.     Comment: Auto resulted.       BNP [[id number]]  (Abnormal) Collected: 02/14/22 1804    Specimen: Blood Updated: 02/14/22 1913     proBNP 8,394.0 pg/mL     Narrative:      Among patients with dyspnea, NT-proBNP is highly sensitive for the detection of acute congestive heart failure. In addition NT-proBNP of <300 pg/ml effectively rules out acute congestive heart failure with 99% negative predictive value.    Results may be falsely decreased if patient taking Biotin.      Lactic Acid, Plasma [[id number]]  (Abnormal) Collected: 02/14/22 1804    Specimen: Blood Updated: 02/14/22 1842     Lactate 3.5 mmol/L     D-dimer, Quantitative [[id number]]  (Abnormal) Collected: 02/14/22 1804    Specimen: Blood Updated: 02/14/22 1831     D-Dimer, Quantitative 514 ng/mL (FEU)     Narrative:      Dimer values <500 ng/ml FEU are FDA approved as aid in diagnosis of deep venous thrombosis and pulmonary embolism.  This test should not be used in an exclusion strategy with pretest probability alone.    A recent guideline regarding diagnosis for pulmonary thromboembolism recommends an adjusted exclusion criterion of age x 10 ng/ml FEU for patients >50 years of age ([employer] Intern Med 2015; 163: 701-711).      CBC & Differential [[id number]]  (Abnormal) Collected: 02/14/22 1804    Specimen: Blood Updated: 02/14/22 1809    Narrative:      The following orders were created for panel order CBC & Differential.  Procedure                               Abnormality         Status                     ---------                               -----------         ------                     CBC Auto Differential[[id number]]        Abnormal            Final result                 Please view results  for these tests on the individual orders.    CBC Auto Differential [[id number]]  (Abnormal) Collected: 02/14/22 1804    Specimen: Blood Updated: 02/14/22 1809     WBC 10.48 10*3/mm3      RBC 5.66 10*6/mm3      Hemoglobin 16.9 g/dL      Hematocrit 50.6 %      MCV 89.4 fL      MCH 29.9 pg      MCHC 33.4 g/dL      RDW 14.5 %      RDW-SD 46.4 fl      MPV 11.3 fL      Platelets 317 10*3/mm3      Neutrophil % 85.0 %      Lymphocyte % 7.3 %      Monocyte % 5.9 %      Eosinophil % 0.3 %      Basophil % 0.6 %      Immature Grans % 0.9 %      Neutrophils, Absolute 8.91 10*3/mm3      Lymphocytes, Absolute 0.77 10*3/mm3      Monocytes, Absolute 0.62 10*3/mm3      Eosinophils, Absolute 0.03 10*3/mm3      Basophils, Absolute 0.06 10*3/mm3      Immature Grans, Absolute 0.09 10*3/mm3      nRBC 0.0 /100 WBC         Imaging Results (Most Recent)     Procedure Component Value Units Date/Time    US Renal Bilateral [[id number]] Collected: 02/15/22 0101     Updated: 02/15/22 0648    Narrative:      EXAM:  US RETROPERITONEUM    ORDERING PROVIDER:  SAEID BEHROOZI    CLINICAL HISTORY:    Left renal mass, renal failure    COMPARISON STUDY:      TECHNIQUE:      Transverse and longitudinal sonographic and Doppler imaging of  bilateral kidneys and urinary bladder was performed.    FINDINGS:        RIGHT KIDNEY:  Seven one 6 x 4.2 x 4.7 cm. The right renal cortex  demonstrates unremarkable atrophic contour and increased renal  cortical echogenicity. No nephrolithiasis. No hydronephrosis. No  solid mass. A cyst is noted in the upper pole measuring 2.2 x 1.5  x 1.5 cm    LEFT  KIDNEY: Suboptimally visualized due to patient's inability  to stop moving . The recently demonstrated mass on the recent CT  scan in the lower pole is 7.6 x 7.8 x 6.5 cm.    URINARY BLADDER: Unremarkable      Impression:      The recently demonstrated mass on the recent CT scan in the lower  pole of the left kidney is 7.6 x 7.8 x 6.5 cm.  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  Right upper pole cyst measuring 2.2 x 1.5 x 1.5 cm.    Electronically signed by:  Boubacar Quinonez MD  2/15/2022 6:46 AM CST  Workstation: [id number]    CT Abdomen Pelvis Without Contrast [[id number]] Collected: 02/14/22 2209     Updated: 02/14/22 2234    Narrative:      EXAM: CT ABDOMEN PELVIS WO CONTRAST  RPID: CT ABDOMEN PELVIS WITHOUT IV CONTRAST    CLINICAL INDICATION: 89 years  old Male with history of elevated  lipase, ARCHANA, abd pain, elevated lactic acid    COMPARISON: None    TECHNIQUE: CT abdomen and pelvis were performed without IV  contrast. From this data, sagittal and coronal reconstructed  images were generated.  RADIATION DOSE REDUCTION: This exam was performed according to  the departmental dose-optimization program, including automated  exposure control, adjustment of the mA and kV according to  patient size, and iterative reconstruction technique; if  available.    FINDINGS:  LUNG BASES: Cardiac pacing leads are noted. No infiltrate is  seen. There are granulomatous right middle lobe. The descending  thoracic aorta is tortuous.    CT ABDOMEN: The gallbladder is markedly distended. A calcified  stone is observed. Correlate clinically for the potential of  cholecystitis. A subcapsular 1.8 x 1.2 cm cyst is observed in the  left lobe of the liver.  There is a mass projecting from the lower pole of the left kidney  6.2 x 5.6 x 6.6 cm. This is of heterogeneous density and is  suspicious for a renal cell carcinoma. Confirmation solid  characteristics with sonography should be made. Ultimately a  contrast-enhanced cross-sectional study may be required.    No bowel wall segments appear thickened or inflamed. Multiple  diverticula are noted.    There is diffuse atherosclerotic plaquing throughout the  aortoiliac system.      CT PELVIS: No free fluid is seen in the pelvis.    Diffuse degenerative changes are seen throughout the  thoracolumbar spine. No skeletal lytic lesions are observed.       Impression:        1. Marked distention of the gallbladder with a gallstone present.  Correlate clinically for cholecystitis.  2. There is a large mass projecting inferiorly from the lower  pole of the left kidney, nonenhanced imaging features are  suspicious for renal cell carcinoma. Consider correlation with a  cross-sectional study if renal function allows or ultrasound.    Electronically signed by:  Calvin Amaya MD  2/14/2022 10:32 PM CST  Workstation: [id number]    XR Chest 1 View [[id number]] Collected: 02/14/22 2202     Updated: 02/14/22 2227    Narrative:      XR CHEST 1 VW  RPID: XR CHEST 1 VIEW  2/14/2022 10:20 PM CST    CLINICAL HISTORY:  89 years old Male with elevated trop, short of breath, elevated  trop    COMPARISON:  none  Number of views: 1  FINDINGS:  The cardiomediastinal silhouette is normal. The lungs are clear.  Pulmonary vascularity is normal. No pleural fluid is seen.    A cardiac pacer is noted on the left. Moderate aortic arch  calcifications are seen.      Impression:      No acute cardiothoracic process is observed.    Electronically signed by:  Calvin Amaya MD  2/14/2022 10:25 PM CST  Workstation: [id number]          Chief Complaint on Day of Discharge: Alert, appears comfortable    Hospital Course:  The patient is a 89 y.o. male who presented to [facility] with hypoxia.  Patient was found to have atrial fibrillation with rapid ventricular response and acute renal failure.  Patient was initially started on Cardizem infusion was transitioned to oral Cardizem.  He was hydrated with acute kidney injury improved.  He was found to have a renal mass suggestive of renal cell carcinoma.  Discussion was had with the family, specifically patient's daughter and decision was made to not pursue any further work-up.  After further discussion with family decision was made for patient to be transitioned to hospice.  This was done and he was discharged home with [facility]  "hospice following.    Condition on Discharge:  Alert, hemodynamically stable    Physical Exam on Discharge:  /56 (BP Location: Left arm, Patient Position: Lying)   Pulse 79   Temp 97.7 °F (36.5 °C) (Temporal)   Resp 16   Ht 177.8 cm (70\")   Wt 55.2 kg (121 lb 9.6 oz)   SpO2 94%   BMI 17.45 kg/m²      Physical Exam  Vitals reviewed.   Constitutional:       Appearance: He is well-developed.   HENT:      Head: Normocephalic and atraumatic.   Eyes:      Pupils: Pupils are equal, round, and reactive to light.   Cardiovascular:      Rate and Rhythm: Normal rate and regular rhythm.      Heart sounds: Normal heart sounds. No murmur heard.  No friction rub. No gallop.    Pulmonary:      Effort: Pulmonary effort is normal. No respiratory distress.      Breath sounds: Normal breath sounds. No wheezing or rales.   Chest:      Chest wall: No tenderness.   Abdominal:      General: Bowel sounds are normal. There is no distension.      Palpations: Abdomen is soft.      Tenderness: There is no abdominal tenderness.   Musculoskeletal:      Cervical back: Normal range of motion and neck supple.   Neurological:      Mental Status: He is alert. Mental status is at baseline.   Psychiatric:         Behavior: Behavior normal.       Discharge Disposition:  Hospice/Home    Discharge Medications:     Discharge Medications      New Medications      Instructions Start Date   LORazepam 2 MG/ML concentrated solution  Commonly known as: ATIVAN   0.5 mg, Oral, Every 2 Hours PRN      morphine 20 MG/ML concentrated solution   5 mg, Oral, Every 2 Hours PRN         Continue These Medications      Instructions Start Date   apixaban 2.5 MG tablet tablet  Commonly known as: ELIQUIS   2.5 mg, Oral, 2 Times Daily      metoprolol tartrate 25 MG tablet  Commonly known as: LOPRESSOR   25 mg, Oral, Daily      predniSONE 2.5 MG tablet  Commonly known as: DELTASONE   3 mg, Oral, Daily      sertraline 50 MG tablet  Commonly known as: ZOLOFT   50 mg, " Oral, Daily         Stop These Medications    dilTIAZem  MG 24 hr capsule  Commonly known as: CARDIZEM CD     multivitamin tablet tablet  Commonly known as: THERAGRAN     ondansetron ODT 4 MG disintegrating tablet  Commonly known as: ZOFRAN-ODT     oyster shell calcium/vitamin d 250-125 MG-UNIT tablet tablet     pseudoephedrine-guaifenesin  MG per 12 hr tablet  Commonly known as: MUCINEX D            Discharge Diet:   Diet Instructions     Advance Diet As Tolerated            Activity at Discharge:   Activity Instructions     Activity as Tolerated            Follow-up Appointments:   [facility] to follow at home            This document has been electronically signed by Glen Caro MD on February 20, 2022 10:44 CST      Time: 35 min

## 2022-02-20 NOTE — PLAN OF CARE
Problem: Adult Inpatient Plan of Care  Goal: Plan of Care Review  Outcome: Ongoing, Progressing  Flowsheets (Taken 2/19/2022 0103)  Progress: no change  Plan of Care Reviewed With: patient     Problem: Adult Inpatient Plan of Care  Goal: Patient-Specific Goal (Individualized)  Outcome: Ongoing, Progressing     Problem: Adult Inpatient Plan of Care  Goal: Absence of Hospital-Acquired Illness or Injury  Outcome: Ongoing, Progressing     Problem: Adult Inpatient Plan of Care  Goal: Absence of Hospital-Acquired Illness or Injury  Intervention: Identify and Manage Fall Risk  Flowsheets  Taken 2/20/2022 0000  Safety Promotion/Fall Prevention:   assistive device/personal items within reach   clutter free environment maintained   nonskid shoes/slippers when out of bed   safety round/check completed  Taken 2/19/2022 2200  Safety Promotion/Fall Prevention:   safety round/check completed   nonskid shoes/slippers when out of bed   clutter free environment maintained   assistive device/personal items within reach  Taken 2/19/2022 2100  Safety Promotion/Fall Prevention:   assistive device/personal items within reach   clutter free environment maintained   nonskid shoes/slippers when out of bed   safety round/check completed  Taken 2/19/2022 2000  Safety Promotion/Fall Prevention:   room organization consistent   nonskid shoes/slippers when out of bed   clutter free environment maintained   assistive device/personal items within reach  Taken 2/19/2022 1900  Safety Promotion/Fall Prevention:   assistive device/personal items within reach   clutter free environment maintained   nonskid shoes/slippers when out of bed   safety round/check completed     Problem: Adult Inpatient Plan of Care  Goal: Absence of Hospital-Acquired Illness or Injury  Intervention: Prevent Skin Injury  Flowsheets  Taken 2/20/2022 0000 by Wilian Martin RN  Body Position: sitting up in bed  Taken 2/19/2022 2200 by Wilian Martin RN  Body Position:  side-lying, right  Taken 2/19/2022 2100 by Wilian Martin RN  Body Position: side-lying, left  Taken 2/19/2022 2000 by Wilian Martin RN  Body Position: side-lying, left  Taken 2/19/2022 1900 by Wilian Martin RN  Body Position: side-lying, right  Taken 2/19/2022 0815 by Felicia Mcclelland RN  Skin Protection:   tubing/devices free from skin contact   transparent dressing maintained   skin-to-skin areas padded   skin sealant/moisture barrier applied   skin-to-device areas padded   adhesive use limited   incontinence pads utilized   protective footwear used     Problem: Adult Inpatient Plan of Care  Goal: Absence of Hospital-Acquired Illness or Injury  Intervention: Prevent and Manage VTE (venous thromboembolism) Risk  Flowsheets  Taken 2/20/2022 0000  VTE Prevention/Management: (pt taking eliquis) --  Taken 2/19/2022 2200  VTE Prevention/Management: (pt taking eliquis) --  Taken 2/19/2022 2100  VTE Prevention/Management: (pt taking eliquis) --  Taken 2/19/2022 2000  VTE Prevention/Management: (pt taking eliquis) --  Taken 2/19/2022 1900  VTE Prevention/Management: (pt taking eliquis) --     Problem: Adult Inpatient Plan of Care  Goal: Absence of Hospital-Acquired Illness or Injury  Intervention: Prevent Infection  Flowsheets  Taken 2/20/2022 0000  Infection Prevention: rest/sleep promoted  Taken 2/19/2022 2200  Infection Prevention: rest/sleep promoted  Taken 2/19/2022 2100  Infection Prevention: rest/sleep promoted  Taken 2/19/2022 2000  Infection Prevention: rest/sleep promoted  Taken 2/19/2022 1900  Infection Prevention: rest/sleep promoted     Problem: Adult Inpatient Plan of Care  Goal: Optimal Comfort and Wellbeing  Outcome: Ongoing, Progressing     Problem: Adult Inpatient Plan of Care  Goal: Optimal Comfort and Wellbeing  Intervention: Provide Person-Centered Care  Flowsheets (Taken 2/19/2022 2000)  Trust Relationship/Rapport:   care explained   questions encouraged     Problem: Adult Inpatient Plan of  Care  Goal: Readiness for Transition of Care  Outcome: Ongoing, Progressing     Problem: Adult Inpatient Plan of Care  Goal: Readiness for Transition of Care  Intervention: Mutually Develop Transition Plan  Flowsheets  Taken 2/15/2022 1621 by Jenifer Lira RN  Equipment Currently Used at Home:   walker, standard   hospital bed  Taken 2/15/2022 1613 by Jenifer Lira, RN  Transportation Anticipated: (ems) other (see comments)  Transportation Concerns: car, none  Patient/Family Anticipated Services at Transition: skilled nursing  Patient/Family Anticipates Transition to: long-term care facility     Problem: Fall Injury Risk  Goal: Absence of Fall and Fall-Related Injury  Outcome: Ongoing, Progressing     Problem: Fall Injury Risk  Goal: Absence of Fall and Fall-Related Injury  Intervention: Identify and Manage Contributors to Fall Injury Risk  Flowsheets  Taken 2/19/2022 2000 by Wilian Martin RN  Medication Review/Management: medications reviewed  Taken 2/16/2022 0915 by Jenifer Lira, RN  Self-Care Promotion: (total assist) other (see comments)     Problem: Fall Injury Risk  Goal: Absence of Fall and Fall-Related Injury  Intervention: Promote Injury-Free Environment  Flowsheets  Taken 2/20/2022 0000  Safety Promotion/Fall Prevention:   assistive device/personal items within reach   clutter free environment maintained   nonskid shoes/slippers when out of bed   safety round/check completed  Taken 2/19/2022 2200  Safety Promotion/Fall Prevention:   safety round/check completed   nonskid shoes/slippers when out of bed   clutter free environment maintained   assistive device/personal items within reach  Taken 2/19/2022 2100  Safety Promotion/Fall Prevention:   assistive device/personal items within reach   clutter free environment maintained   nonskid shoes/slippers when out of bed   safety round/check completed  Taken 2/19/2022 2000  Safety Promotion/Fall Prevention:   room organization consistent   nonskid  shoes/slippers when out of bed   clutter free environment maintained   assistive device/personal items within reach  Taken 2/19/2022 1900  Safety Promotion/Fall Prevention:   assistive device/personal items within reach   clutter free environment maintained   nonskid shoes/slippers when out of bed   safety round/check completed     Problem: Skin Injury Risk Increased  Goal: Skin Health and Integrity  Outcome: Ongoing, Progressing     Problem: Skin Injury Risk Increased  Goal: Skin Health and Integrity  Intervention: Optimize Skin Protection  Flowsheets  Taken 2/20/2022 0000 by Wilian Martin RN  Pressure Reduction Techniques: weight shift assistance provided  Head of Bed (HOB): HOB at 30-45 degrees  Pressure Reduction Devices: specialty bed utilized  Taken 2/19/2022 2200 by Wilian Martin RN  Pressure Reduction Techniques: weight shift assistance provided  Head of Bed (HOB): HOB at 20-30 degrees  Pressure Reduction Devices: specialty bed utilized  Taken 2/19/2022 2100 by Wilian Martin RN  Pressure Reduction Techniques: frequent weight shift encouraged  Head of Bed (HOB): HOB at 20-30 degrees  Pressure Reduction Devices: specialty bed utilized  Taken 2/19/2022 2000 by Wilian Martin RN  Pressure Reduction Techniques: weight shift assistance provided  Head of Bed (HOB): HOB at 20-30 degrees  Pressure Reduction Devices: specialty bed utilized  Taken 2/19/2022 1900 by Wilian Martin RN  Pressure Reduction Techniques: frequent weight shift encouraged  Head of Bed (HOB): HOB at 20-30 degrees  Pressure Reduction Devices: specialty bed utilized  Taken 2/19/2022 0815 by Felicia Mcclelland RN  Skin Protection:   tubing/devices free from skin contact   transparent dressing maintained   skin-to-skin areas padded   skin sealant/moisture barrier applied   skin-to-device areas padded   adhesive use limited   incontinence pads utilized   protective footwear used   Goal Outcome Evaluation:  Plan of Care Reviewed With:  patient        Progress: no change

## (undated) DEVICE — ELECTRODE,RT,STRESS,FOAM,50PK: Brand: MEDLINE

## (undated) DEVICE — PK PM 60